# Patient Record
Sex: MALE | Race: BLACK OR AFRICAN AMERICAN | NOT HISPANIC OR LATINO | Employment: FULL TIME | URBAN - METROPOLITAN AREA
[De-identification: names, ages, dates, MRNs, and addresses within clinical notes are randomized per-mention and may not be internally consistent; named-entity substitution may affect disease eponyms.]

---

## 2023-02-12 PROBLEM — R20.0 NUMBNESS OF RIGHT HAND: Status: ACTIVE | Noted: 2023-02-12

## 2023-02-12 PROBLEM — E06.1 SUBACUTE THYROIDITIS: Status: ACTIVE | Noted: 2023-02-12

## 2023-02-12 PROBLEM — S83.289A ACUTE LATERAL MENISCAL TEAR: Status: ACTIVE | Noted: 2023-02-12

## 2023-02-12 PROBLEM — R20.9 SENSORY DISTURBANCE: Status: ACTIVE | Noted: 2023-02-12

## 2023-02-12 PROBLEM — K65.1 ABSCESS OF ABDOMINAL CAVITY (MULTI): Status: ACTIVE | Noted: 2023-02-12

## 2023-02-12 PROBLEM — R73.03 PREDIABETES: Status: ACTIVE | Noted: 2023-02-12

## 2023-02-12 PROBLEM — M54.50 LOW BACK PAIN: Status: ACTIVE | Noted: 2023-02-12

## 2023-02-12 PROBLEM — G56.00 CARPAL TUNNEL SYNDROME: Status: ACTIVE | Noted: 2023-02-12

## 2023-02-12 PROBLEM — K40.90 INGUINAL HERNIA: Status: ACTIVE | Noted: 2023-02-12

## 2023-02-12 PROBLEM — E03.9 HYPOTHYROIDISM: Status: ACTIVE | Noted: 2023-02-12

## 2023-02-12 PROBLEM — H00.019 STYE: Status: ACTIVE | Noted: 2023-02-12

## 2023-02-12 PROBLEM — G47.33 OSA (OBSTRUCTIVE SLEEP APNEA): Status: ACTIVE | Noted: 2023-02-12

## 2023-02-12 PROBLEM — E03.8 HYPOTHYROIDISM DUE TO HASHIMOTO'S THYROIDITIS: Status: ACTIVE | Noted: 2023-02-12

## 2023-02-12 PROBLEM — G56.02 LEFT CARPAL TUNNEL SYNDROME: Status: ACTIVE | Noted: 2023-02-12

## 2023-02-12 PROBLEM — M76.60 ACHILLES TENDINITIS: Status: ACTIVE | Noted: 2023-02-12

## 2023-02-12 PROBLEM — H10.32 ACUTE BACTERIAL CONJUNCTIVITIS OF LEFT EYE: Status: ACTIVE | Noted: 2023-02-12

## 2023-02-12 PROBLEM — M54.16 LUMBAR RADICULOPATHY: Status: ACTIVE | Noted: 2023-02-12

## 2023-02-12 PROBLEM — E78.5 HYPERLIPIDEMIA: Status: ACTIVE | Noted: 2023-02-12

## 2023-02-12 PROBLEM — G47.00 INSOMNIA: Status: ACTIVE | Noted: 2023-02-12

## 2023-02-12 PROBLEM — G56.01 RIGHT CARPAL TUNNEL SYNDROME: Status: ACTIVE | Noted: 2023-02-12

## 2023-02-12 PROBLEM — E55.9 VITAMIN D DEFICIENCY: Status: ACTIVE | Noted: 2023-02-12

## 2023-02-12 PROBLEM — R53.82 CHRONIC FATIGUE: Status: ACTIVE | Noted: 2023-02-12

## 2023-02-12 PROBLEM — R20.0 NUMBNESS OF RIGHT FOOT: Status: ACTIVE | Noted: 2023-02-12

## 2023-02-12 PROBLEM — J22 ACUTE RESPIRATORY INFECTION: Status: ACTIVE | Noted: 2023-02-12

## 2023-02-12 PROBLEM — G62.9 PERIPHERAL NEUROPATHY: Status: ACTIVE | Noted: 2023-02-12

## 2023-02-12 PROBLEM — B07.9 WARTS: Status: ACTIVE | Noted: 2023-02-12

## 2023-02-12 PROBLEM — M21.40 PES PLANUS: Status: ACTIVE | Noted: 2023-02-12

## 2023-02-12 PROBLEM — D64.9 LOW HEMOGLOBIN: Status: ACTIVE | Noted: 2023-02-12

## 2023-02-12 PROBLEM — E06.3 HYPOTHYROIDISM DUE TO HASHIMOTO'S THYROIDITIS: Status: ACTIVE | Noted: 2023-02-12

## 2023-02-12 RX ORDER — LEVOTHYROXINE SODIUM 200 UG/1
1 TABLET ORAL DAILY
COMMUNITY
Start: 2017-06-14 | End: 2023-12-27 | Stop reason: SDUPTHER

## 2023-02-12 RX ORDER — LEVOTHYROXINE SODIUM 50 UG/1
50 TABLET ORAL DAILY
COMMUNITY
End: 2023-12-27 | Stop reason: SDUPTHER

## 2023-02-12 RX ORDER — LORATADINE 10 MG/1
1 TABLET ORAL DAILY
COMMUNITY
Start: 2022-09-21 | End: 2023-03-22 | Stop reason: SDUPTHER

## 2023-02-12 RX ORDER — ROSUVASTATIN CALCIUM 40 MG/1
1 TABLET, COATED ORAL DAILY
COMMUNITY
Start: 2022-01-05 | End: 2023-03-22 | Stop reason: SDUPTHER

## 2023-03-22 ENCOUNTER — OFFICE VISIT (OUTPATIENT)
Dept: PRIMARY CARE | Facility: CLINIC | Age: 43
End: 2023-03-22
Payer: COMMERCIAL

## 2023-03-22 VITALS
HEIGHT: 71 IN | BODY MASS INDEX: 30.52 KG/M2 | SYSTOLIC BLOOD PRESSURE: 120 MMHG | HEART RATE: 76 BPM | WEIGHT: 218 LBS | DIASTOLIC BLOOD PRESSURE: 90 MMHG

## 2023-03-22 DIAGNOSIS — E78.2 MIXED HYPERLIPIDEMIA: ICD-10-CM

## 2023-03-22 DIAGNOSIS — H92.01 OTALGIA OF RIGHT EAR: Primary | ICD-10-CM

## 2023-03-22 DIAGNOSIS — J30.2 SEASONAL ALLERGIES: ICD-10-CM

## 2023-03-22 DIAGNOSIS — I10 BENIGN HYPERTENSION: ICD-10-CM

## 2023-03-22 PROCEDURE — 3080F DIAST BP >= 90 MM HG: CPT | Performed by: NURSE PRACTITIONER

## 2023-03-22 PROCEDURE — 99214 OFFICE O/P EST MOD 30 MIN: CPT | Performed by: NURSE PRACTITIONER

## 2023-03-22 PROCEDURE — 4004F PT TOBACCO SCREEN RCVD TLK: CPT | Performed by: NURSE PRACTITIONER

## 2023-03-22 PROCEDURE — 3074F SYST BP LT 130 MM HG: CPT | Performed by: NURSE PRACTITIONER

## 2023-03-22 RX ORDER — CHLORTHALIDONE 25 MG/1
25 TABLET ORAL DAILY
Qty: 90 TABLET | Refills: 1 | Status: SHIPPED | OUTPATIENT
Start: 2023-03-22 | End: 2023-08-09

## 2023-03-22 RX ORDER — ROSUVASTATIN CALCIUM 40 MG/1
40 TABLET, COATED ORAL DAILY
Qty: 90 TABLET | Refills: 1 | Status: SHIPPED | OUTPATIENT
Start: 2023-03-22 | End: 2023-08-11 | Stop reason: SDUPTHER

## 2023-03-22 RX ORDER — LORATADINE 10 MG/1
10 TABLET ORAL DAILY
Qty: 30 TABLET | Refills: 1 | Status: SHIPPED | OUTPATIENT
Start: 2023-03-22 | End: 2023-08-09

## 2023-03-22 RX ORDER — CIPROFLOXACIN AND DEXAMETHASONE 3; 1 MG/ML; MG/ML
4 SUSPENSION/ DROPS AURICULAR (OTIC) 2 TIMES DAILY
Qty: 7.5 ML | Refills: 0 | Status: SHIPPED | OUTPATIENT
Start: 2023-03-22 | End: 2023-03-29

## 2023-03-22 NOTE — ASSESSMENT & PLAN NOTE
Start statin. Watch foods high in cholesterol (fried foods, fast food, processed meats, desserts such as cookies, cake, ice cream, pastries and other sweets). Some foods that are high in cholesterol are healthy additions to your diet (eggs, cheese, shellfish, pasture raised steak, organ meats such as heart, kidney and liver, sardines and full-fat yogurt.

## 2023-03-22 NOTE — PROGRESS NOTES
"Subjective   Patient ID: Mac Bello is a 42 y.o. male who presents for Follow-up (Pt states he is here for a routine follow up visit ), Hyperlipidemia, and Earache (Right ear pain ).      Hyperlipidemia  This is a chronic problem. The problem is uncontrolled. Recent lipid tests were reviewed and are high. Current antihyperlipidemic treatment includes statins. Risk factors for coronary artery disease include hypertension and dyslipidemia.   Earache   There is pain in the right ear. This is a new problem. The current episode started in the past 7 days. The problem has been waxing and waning. There has been no fever. Pertinent negatives include no ear discharge. He has tried nothing for the symptoms.        Review of Systems   HENT:  Positive for ear pain. Negative for ear discharge.    All other systems reviewed and are negative.      Objective   BP (!) 143/93   Pulse 76   Ht 1.803 m (5' 11\")   Wt 98.9 kg (218 lb)   BMI 30.40 kg/m²     Physical Exam  Constitutional:       Appearance: Normal appearance.   HENT:      Head: Normocephalic and atraumatic.      Right Ear: Tympanic membrane and external ear normal. There is no impacted cerumen.      Left Ear: Tympanic membrane, ear canal and external ear normal.      Nose: Nose normal.      Mouth/Throat:      Mouth: Mucous membranes are dry.      Pharynx: Oropharynx is clear.   Eyes:      Extraocular Movements: Extraocular movements intact.      Conjunctiva/sclera: Conjunctivae normal.      Pupils: Pupils are equal, round, and reactive to light.   Cardiovascular:      Rate and Rhythm: Normal rate and regular rhythm.   Pulmonary:      Effort: Pulmonary effort is normal.      Breath sounds: Normal breath sounds.   Abdominal:      General: Abdomen is flat. Bowel sounds are normal.      Palpations: Abdomen is soft.   Musculoskeletal:         General: Normal range of motion.      Cervical back: Normal range of motion.   Skin:     General: Skin is warm and dry. "   Neurological:      General: No focal deficit present.      Mental Status: He is alert and oriented to person, place, and time. Mental status is at baseline.   Psychiatric:         Mood and Affect: Mood normal.         Behavior: Behavior normal.         Thought Content: Thought content normal.         Judgment: Judgment normal.         Assessment/Plan   Problem List Items Addressed This Visit          Other    Hyperlipidemia     Start statin. Watch foods high in cholesterol (fried foods, fast food, processed meats, desserts such as cookies, cake, ice cream, pastries and other sweets). Some foods that are high in cholesterol are healthy additions to your diet (eggs, cheese, shellfish, pasture raised steak, organ meats such as heart, kidney and liver, sardines and full-fat yogurt.          Relevant Medications    rosuvastatin (Crestor) 40 mg tablet     Other Visit Diagnoses       Otalgia of right ear    -  Primary    Relevant Medications    ciprofloxacin-dexamethasone (CiproDEX) otic suspension    Seasonal allergies        Relevant Medications    loratadine (Claritin) 10 mg tablet    Benign hypertension        Relevant Medications    chlorthalidone (Hygroton) 25 mg tablet          Has upcoming blood work with endocrinology.

## 2023-04-19 LAB
ALANINE AMINOTRANSFERASE (SGPT) (U/L) IN SER/PLAS: 29 U/L (ref 10–52)
ALBUMIN (G/DL) IN SER/PLAS: 4.8 G/DL (ref 3.4–5)
ALKALINE PHOSPHATASE (U/L) IN SER/PLAS: 54 U/L (ref 33–120)
ANION GAP IN SER/PLAS: 15 MMOL/L (ref 10–20)
ASPARTATE AMINOTRANSFERASE (SGOT) (U/L) IN SER/PLAS: 29 U/L (ref 9–39)
BILIRUBIN TOTAL (MG/DL) IN SER/PLAS: 0.4 MG/DL (ref 0–1.2)
CALCIDIOL (25 OH VITAMIN D3) (NG/ML) IN SER/PLAS: 22 NG/ML
CALCIUM (MG/DL) IN SER/PLAS: 10 MG/DL (ref 8.6–10.6)
CARBON DIOXIDE, TOTAL (MMOL/L) IN SER/PLAS: 29 MMOL/L (ref 21–32)
CHLORIDE (MMOL/L) IN SER/PLAS: 105 MMOL/L (ref 98–107)
CHOLESTEROL (MG/DL) IN SER/PLAS: 210 MG/DL (ref 0–199)
CHOLESTEROL IN HDL (MG/DL) IN SER/PLAS: 53.6 MG/DL
CHOLESTEROL/HDL RATIO: 3.9
CORTISOL (UG/DL) IN SERUM - AM: 15.1 UG/DL (ref 5–20)
CREATININE (MG/DL) IN SER/PLAS: 0.97 MG/DL (ref 0.5–1.3)
DEHYDROEPIANDROSTERONE SULFATE (DHEA-S) (UG/DL) IN SER/: 379 UG/DL (ref 95–530)
ESTIMATED AVERAGE GLUCOSE FOR HBA1C: 126 MG/DL
FOLLITROPIN (IU/L) IN SER/PLAS: 7.9 IU/L
GFR MALE: >90 ML/MIN/1.73M2
GLUCOSE (MG/DL) IN SER/PLAS: 104 MG/DL (ref 74–99)
HEMOGLOBIN A1C/HEMOGLOBIN TOTAL IN BLOOD: 6 %
INSULIN: 13 UIU/ML (ref 3–25)
LDL: 140 MG/DL (ref 0–99)
LUTEINIZING HORMONE (IU/ML) IN SER/PLAS: 6.4 IU/L
PARATHYRIN INTACT (PG/ML) IN SER/PLAS: 41.5 PG/ML (ref 18.5–88)
POTASSIUM (MMOL/L) IN SER/PLAS: 4.3 MMOL/L (ref 3.5–5.3)
PROLACTIN (UG/L) IN SER/PLAS: 4.5 UG/L (ref 2–18)
PROTEIN TOTAL: 7.2 G/DL (ref 6.4–8.2)
SODIUM (MMOL/L) IN SER/PLAS: 145 MMOL/L (ref 136–145)
THYROPEROXIDASE AB (IU/ML) IN SER/PLAS: >1000 IU/ML
THYROTROPIN (MIU/L) IN SER/PLAS BY DETECTION LIMIT <= 0.05 MIU/L: 0.26 MIU/L (ref 0.44–3.98)
THYROXINE (T4) FREE (NG/DL) IN SER/PLAS: 1.99 NG/DL (ref 0.78–1.48)
TRIGLYCERIDE (MG/DL) IN SER/PLAS: 83 MG/DL (ref 0–149)
TRIIODOTHYRONINE (T3) (NG/DL) IN SER/PLAS: 151 NG/DL (ref 60–200)
UREA NITROGEN (MG/DL) IN SER/PLAS: 28 MG/DL (ref 6–23)
VLDL: 17 MG/DL (ref 0–40)

## 2023-04-22 LAB — ADRENOCORTICOTROPIC HORMONE: 37.9 PG/ML (ref 7.2–63.3)

## 2023-04-24 LAB — ALPHA SUBUNIT: 0.2 NG/ML

## 2023-04-26 ENCOUNTER — OFFICE VISIT (OUTPATIENT)
Dept: PRIMARY CARE | Facility: CLINIC | Age: 43
End: 2023-04-26
Payer: COMMERCIAL

## 2023-04-26 VITALS
BODY MASS INDEX: 28.7 KG/M2 | SYSTOLIC BLOOD PRESSURE: 132 MMHG | HEART RATE: 81 BPM | HEIGHT: 71 IN | WEIGHT: 205 LBS | DIASTOLIC BLOOD PRESSURE: 84 MMHG

## 2023-04-26 DIAGNOSIS — R73.03 PREDIABETES: ICD-10-CM

## 2023-04-26 DIAGNOSIS — E06.3 HYPOTHYROIDISM DUE TO HASHIMOTO'S THYROIDITIS: Primary | ICD-10-CM

## 2023-04-26 DIAGNOSIS — E55.9 VITAMIN D DEFICIENCY: ICD-10-CM

## 2023-04-26 DIAGNOSIS — E78.2 MIXED HYPERLIPIDEMIA: ICD-10-CM

## 2023-04-26 DIAGNOSIS — E03.8 HYPOTHYROIDISM DUE TO HASHIMOTO'S THYROIDITIS: Primary | ICD-10-CM

## 2023-04-26 LAB
TESTOSTERONE FREE (CHAN): 71.9 PG/ML (ref 35–155)
TESTOSTERONE,TOTAL,LC-MS/MS: 411 NG/DL (ref 250–1100)
THYROID STIMULATING IMMUNOGLOBULIN: <1 TSI INDEX

## 2023-04-26 PROCEDURE — 4004F PT TOBACCO SCREEN RCVD TLK: CPT | Performed by: NURSE PRACTITIONER

## 2023-04-26 PROCEDURE — 99213 OFFICE O/P EST LOW 20 MIN: CPT | Performed by: NURSE PRACTITIONER

## 2023-04-26 RX ORDER — ACETAMINOPHEN 500 MG
50 TABLET ORAL DAILY
Qty: 90 CAPSULE | Refills: 1 | Status: SHIPPED | OUTPATIENT
Start: 2023-04-26 | End: 2023-08-09

## 2023-04-26 NOTE — PROGRESS NOTES
"Subjective   Patient ID: Mac Bello is a 42 y.o. male who presents for Follow-up (Follow up visit on lab work ).    Mca presents to the office today for follow-up on medications. He has been feeling well. Recently had blood work done that was ordered by Dr. Mckeon (endocrinology). Stated he hasn't felt this well for the past 3 years.          Review of Systems   All other systems reviewed and are negative.      Objective   /84   Pulse 81   Ht 1.803 m (5' 11\")   Wt 93 kg (205 lb)   BMI 28.59 kg/m²     Physical Exam  Constitutional:       Appearance: Normal appearance.   HENT:      Head: Normocephalic and atraumatic.      Right Ear: Tympanic membrane, ear canal and external ear normal.      Left Ear: Tympanic membrane, ear canal and external ear normal.      Nose: Nose normal.      Mouth/Throat:      Mouth: Mucous membranes are moist.      Pharynx: Oropharynx is clear.   Eyes:      Extraocular Movements: Extraocular movements intact.      Conjunctiva/sclera: Conjunctivae normal.      Pupils: Pupils are equal, round, and reactive to light.   Cardiovascular:      Rate and Rhythm: Normal rate and regular rhythm.   Pulmonary:      Effort: Pulmonary effort is normal.      Breath sounds: Normal breath sounds.   Abdominal:      General: Abdomen is flat. Bowel sounds are normal.      Palpations: Abdomen is soft.   Musculoskeletal:         General: Normal range of motion.      Cervical back: Normal range of motion.   Skin:     General: Skin is warm and dry.   Neurological:      General: No focal deficit present.      Mental Status: He is alert and oriented to person, place, and time. Mental status is at baseline.   Psychiatric:         Mood and Affect: Mood normal.         Behavior: Behavior normal.         Thought Content: Thought content normal.         Judgment: Judgment normal.         Assessment/Plan   Problem List Items Addressed This Visit          Endocrine/Metabolic    Hypothyroidism - Primary    " Prediabetes       Other    Hyperlipidemia     1) Hypothyroidism: TSH low at 0.26. Per Dr. Mckeon, change Levothyroxine to 250 mcg weekdays and 200 mcg on weekends. Recheck TSH in 8 weeks.    2) Prediabetes: A1c 6.0. Counseled on watching daily carbohydrates (portion control) such as breads, pasta, rice, starchy vegetables and sweets.     3) Hyperlipidemia: cholesterol levels improving. Watch foods high in cholesterol (fried foods, fast food, processed meats, desserts such as cookies, cake, ice cream, pastries and other sweets). Some foods that are high in cholesterol are healthy additions to your diet (eggs, cheese, shellfish, pasture raised steak, organ meats such as heart, kidney and liver, sardines and full-fat yogurt.     4) Vitamin D deficiency: vitamin D level low. Counseled on foods high in vitamin D (tuna, salmon, milk, orange juice, soy milk, cereals, beef liver, cheese, egg yolks and mushrooms). Start vitamin D 2000 units daily.

## 2023-08-09 ENCOUNTER — OFFICE VISIT (OUTPATIENT)
Dept: PRIMARY CARE | Facility: CLINIC | Age: 43
End: 2023-08-09
Payer: COMMERCIAL

## 2023-08-09 VITALS
SYSTOLIC BLOOD PRESSURE: 119 MMHG | HEART RATE: 55 BPM | BODY MASS INDEX: 27.44 KG/M2 | DIASTOLIC BLOOD PRESSURE: 77 MMHG | HEIGHT: 71 IN | WEIGHT: 196 LBS

## 2023-08-09 DIAGNOSIS — E03.9 HYPOTHYROIDISM, UNSPECIFIED TYPE: Primary | ICD-10-CM

## 2023-08-09 DIAGNOSIS — M25.512 ACUTE PAIN OF LEFT SHOULDER: ICD-10-CM

## 2023-08-09 DIAGNOSIS — E78.2 MIXED HYPERLIPIDEMIA: ICD-10-CM

## 2023-08-09 LAB
ALANINE AMINOTRANSFERASE (SGPT) (U/L) IN SER/PLAS: 18 U/L (ref 10–52)
ALBUMIN (G/DL) IN SER/PLAS: 4.5 G/DL (ref 3.4–5)
ALKALINE PHOSPHATASE (U/L) IN SER/PLAS: 61 U/L (ref 33–120)
ANION GAP IN SER/PLAS: 16 MMOL/L (ref 10–20)
ASPARTATE AMINOTRANSFERASE (SGOT) (U/L) IN SER/PLAS: 18 U/L (ref 9–39)
BILIRUBIN TOTAL (MG/DL) IN SER/PLAS: 0.6 MG/DL (ref 0–1.2)
CALCIUM (MG/DL) IN SER/PLAS: 9.6 MG/DL (ref 8.6–10.6)
CARBON DIOXIDE, TOTAL (MMOL/L) IN SER/PLAS: 27 MMOL/L (ref 21–32)
CHLORIDE (MMOL/L) IN SER/PLAS: 105 MMOL/L (ref 98–107)
CHOLESTEROL (MG/DL) IN SER/PLAS: 242 MG/DL (ref 0–199)
CHOLESTEROL IN HDL (MG/DL) IN SER/PLAS: 73 MG/DL
CHOLESTEROL/HDL RATIO: 3.3
CREATININE (MG/DL) IN SER/PLAS: 0.97 MG/DL (ref 0.5–1.3)
GFR MALE: >90 ML/MIN/1.73M2
GLUCOSE (MG/DL) IN SER/PLAS: 107 MG/DL (ref 74–99)
LDL: 148 MG/DL (ref 0–99)
POTASSIUM (MMOL/L) IN SER/PLAS: 3.8 MMOL/L (ref 3.5–5.3)
PROTEIN TOTAL: 6.9 G/DL (ref 6.4–8.2)
SODIUM (MMOL/L) IN SER/PLAS: 144 MMOL/L (ref 136–145)
THYROTROPIN (MIU/L) IN SER/PLAS BY DETECTION LIMIT <= 0.05 MIU/L: 1.51 MIU/L (ref 0.44–3.98)
TRIGLYCERIDE (MG/DL) IN SER/PLAS: 104 MG/DL (ref 0–149)
UREA NITROGEN (MG/DL) IN SER/PLAS: 21 MG/DL (ref 6–23)
VLDL: 21 MG/DL (ref 0–40)

## 2023-08-09 PROCEDURE — 80061 LIPID PANEL: CPT

## 2023-08-09 PROCEDURE — 4004F PT TOBACCO SCREEN RCVD TLK: CPT | Performed by: NURSE PRACTITIONER

## 2023-08-09 PROCEDURE — 84443 ASSAY THYROID STIM HORMONE: CPT

## 2023-08-09 PROCEDURE — 80053 COMPREHEN METABOLIC PANEL: CPT

## 2023-08-09 PROCEDURE — 99214 OFFICE O/P EST MOD 30 MIN: CPT | Performed by: NURSE PRACTITIONER

## 2023-08-09 RX ORDER — PREDNISONE 10 MG/1
TABLET ORAL
Qty: 20 TABLET | Refills: 0 | Status: SHIPPED | OUTPATIENT
Start: 2023-08-09 | End: 2023-12-27 | Stop reason: ALTCHOICE

## 2023-08-09 ASSESSMENT — ENCOUNTER SYMPTOMS
LOSS OF SENSATION IN FEET: 0
DEPRESSION: 0
OCCASIONAL FEELINGS OF UNSTEADINESS: 0

## 2023-08-09 ASSESSMENT — PATIENT HEALTH QUESTIONNAIRE - PHQ9
1. LITTLE INTEREST OR PLEASURE IN DOING THINGS: NOT AT ALL
2. FEELING DOWN, DEPRESSED OR HOPELESS: NOT AT ALL
SUM OF ALL RESPONSES TO PHQ9 QUESTIONS 1 AND 2: 0

## 2023-08-09 NOTE — PROGRESS NOTES
"Subjective   Patient ID: Mac Bello is a 42 y.o. male who presents for Follow-up (3 month routine follow up visit for thyroid ).    Mac is a 42-year-old male who presents to the office today for follow-up on hypothyroidism.  He is pleased with his weight; stating he has not been in the 190s for a very long time.    He reported that he has been having left shoulder pain.  He denied any recent injury, trauma or heavy lifting.  Stated his shoulder has been hurting for the past 2 weeks.  He does have decreased range of motion in his left shoulder with abduction. Denied exercising and lifting weights. Has not been taking any OTC pain medications on a routine basis.     Admitted he stopped taking cholesterol medication since his cholesterol \"had improved\".         Review of Systems   All other systems reviewed and are negative.      Objective   /77   Pulse 55   Ht 1.803 m (5' 11\")   Wt 88.9 kg (196 lb)   BMI 27.34 kg/m²     Physical Exam  Constitutional:       Appearance: Normal appearance.   HENT:      Head: Normocephalic and atraumatic.      Right Ear: Tympanic membrane, ear canal and external ear normal.      Left Ear: Tympanic membrane, ear canal and external ear normal.      Nose: Nose normal.      Mouth/Throat:      Mouth: Mucous membranes are moist.      Pharynx: Oropharynx is clear.   Eyes:      Extraocular Movements: Extraocular movements intact.      Conjunctiva/sclera: Conjunctivae normal.      Pupils: Pupils are equal, round, and reactive to light.   Cardiovascular:      Rate and Rhythm: Normal rate and regular rhythm.   Pulmonary:      Effort: Pulmonary effort is normal.      Breath sounds: Normal breath sounds.   Abdominal:      General: Abdomen is flat. Bowel sounds are normal.      Palpations: Abdomen is soft.   Musculoskeletal:      Cervical back: Normal range of motion.      Comments: Decreased ROM abduction in left shoulder. No point tenderness. Strength 5/5.   Skin:     General: Skin is " warm and dry.   Neurological:      General: No focal deficit present.      Mental Status: He is alert and oriented to person, place, and time. Mental status is at baseline.   Psychiatric:         Mood and Affect: Mood normal.         Behavior: Behavior normal.         Thought Content: Thought content normal.         Judgment: Judgment normal.         Assessment/Plan   Problem List Items Addressed This Visit       Hyperlipidemia    Relevant Orders    Comprehensive Metabolic Panel    Lipid Panel    Hypothyroidism - Primary    Relevant Orders    Thyroid Stimulating Hormone    T4, free    T3, free     Other Visit Diagnoses       Acute pain of left shoulder        Relevant Medications    predniSONE (Deltasone) 10 mg tablet    Other Relevant Orders    XR shoulder left 2+ views          1) Hypothyroidism: thyroid blood work ordered. Continue Levothyroxine.     2) Left shoulder pain: x-ray ordered of left shoulder. You were prescribed tapering dose of Prednisone. Recommend heat/ice alternation to left shoulder along with shoulder stretches/exercises.    3) Hyperlipidemia: CMP and lipids ordered. Watch foods high in cholesterol (fried foods, fast food, processed meats, desserts such as cookies, cake, ice cream, pastries and other sweets). Some foods that are high in cholesterol are healthy additions to your diet (eggs, cheese, shellfish, pasture raised steak, organ meats such as heart, kidney and liver, sardines and full-fat yogurt.

## 2023-08-10 ENCOUNTER — TELEPHONE (OUTPATIENT)
Dept: PRIMARY CARE | Facility: CLINIC | Age: 43
End: 2023-08-10
Payer: COMMERCIAL

## 2023-08-10 DIAGNOSIS — E78.2 MIXED HYPERLIPIDEMIA: ICD-10-CM

## 2023-08-11 RX ORDER — ROSUVASTATIN CALCIUM 20 MG/1
20 TABLET, COATED ORAL DAILY
Qty: 90 TABLET | Refills: 1 | Status: SHIPPED | OUTPATIENT
Start: 2023-08-11 | End: 2023-12-27 | Stop reason: SDUPTHER

## 2023-08-14 ENCOUNTER — TELEPHONE (OUTPATIENT)
Dept: PRIMARY CARE | Facility: CLINIC | Age: 43
End: 2023-08-14
Payer: COMMERCIAL

## 2023-08-16 ENCOUNTER — TELEPHONE (OUTPATIENT)
Dept: PRIMARY CARE | Facility: CLINIC | Age: 43
End: 2023-08-16
Payer: COMMERCIAL

## 2023-08-21 ENCOUNTER — TELEPHONE (OUTPATIENT)
Dept: PRIMARY CARE | Facility: CLINIC | Age: 43
End: 2023-08-21
Payer: COMMERCIAL

## 2023-12-27 ENCOUNTER — OFFICE VISIT (OUTPATIENT)
Dept: PRIMARY CARE | Facility: CLINIC | Age: 43
End: 2023-12-27
Payer: COMMERCIAL

## 2023-12-27 VITALS
HEIGHT: 71 IN | DIASTOLIC BLOOD PRESSURE: 81 MMHG | HEART RATE: 62 BPM | SYSTOLIC BLOOD PRESSURE: 137 MMHG | BODY MASS INDEX: 28.14 KG/M2 | WEIGHT: 201 LBS

## 2023-12-27 DIAGNOSIS — E03.9 HYPOTHYROIDISM, UNSPECIFIED TYPE: ICD-10-CM

## 2023-12-27 DIAGNOSIS — Z00.00 ANNUAL PHYSICAL EXAM: Primary | ICD-10-CM

## 2023-12-27 DIAGNOSIS — E78.2 MIXED HYPERLIPIDEMIA: ICD-10-CM

## 2023-12-27 DIAGNOSIS — M79.672 PAIN OF LEFT HEEL: ICD-10-CM

## 2023-12-27 LAB
T4 FREE SERPL-MCNC: <0.2 NG/DL (ref 0.78–1.48)
TSH SERPL-ACNC: >120 MIU/L (ref 0.44–3.98)

## 2023-12-27 PROCEDURE — 99213 OFFICE O/P EST LOW 20 MIN: CPT | Performed by: NURSE PRACTITIONER

## 2023-12-27 PROCEDURE — 84443 ASSAY THYROID STIM HORMONE: CPT

## 2023-12-27 PROCEDURE — 4004F PT TOBACCO SCREEN RCVD TLK: CPT | Performed by: NURSE PRACTITIONER

## 2023-12-27 PROCEDURE — 99396 PREV VISIT EST AGE 40-64: CPT | Performed by: NURSE PRACTITIONER

## 2023-12-27 PROCEDURE — 84439 ASSAY OF FREE THYROXINE: CPT

## 2023-12-27 PROCEDURE — 36415 COLL VENOUS BLD VENIPUNCTURE: CPT

## 2023-12-27 RX ORDER — LEVOTHYROXINE SODIUM 50 UG/1
50 TABLET ORAL
Qty: 90 TABLET | Refills: 1 | Status: SHIPPED | OUTPATIENT
Start: 2023-12-27 | End: 2024-04-04

## 2023-12-27 RX ORDER — ROSUVASTATIN CALCIUM 20 MG/1
20 TABLET, COATED ORAL DAILY
Qty: 90 TABLET | Refills: 1 | Status: SHIPPED | OUTPATIENT
Start: 2023-12-27 | End: 2024-04-04

## 2023-12-27 RX ORDER — LEVOTHYROXINE SODIUM 200 UG/1
200 TABLET ORAL
Qty: 90 TABLET | Refills: 1 | Status: SHIPPED | OUTPATIENT
Start: 2023-12-27 | End: 2024-04-04

## 2023-12-27 ASSESSMENT — PATIENT HEALTH QUESTIONNAIRE - PHQ9
SUM OF ALL RESPONSES TO PHQ9 QUESTIONS 1 AND 2: 0
1. LITTLE INTEREST OR PLEASURE IN DOING THINGS: NOT AT ALL
2. FEELING DOWN, DEPRESSED OR HOPELESS: NOT AT ALL

## 2023-12-27 ASSESSMENT — ENCOUNTER SYMPTOMS
LOSS OF SENSATION IN FEET: 0
OCCASIONAL FEELINGS OF UNSTEADINESS: 0
DEPRESSION: 0

## 2023-12-27 NOTE — PROGRESS NOTES
Reason for Visit: Annual Physical Exam    HPI: Mac is a 43 year old male who presents to the office today for an annual physical exam. Stated he has been out of his thyroid medication for the past 3 weeks.     Heel of left foot hurting. Going to [place] in Feb. Would like to have foot evaluated prior to trip to [place] as he will be doing significant walking while there.    Continues to have left shoulder pain. Did not get shoulder x-ray.       Active Problem List  Patient Active Problem List   Diagnosis    Abscess of abdominal cavity (CMS/[facility])    Achilles tendinitis    Acute bacterial conjunctivitis of left eye    Acute lateral meniscal tear    Acute respiratory infection    Right carpal tunnel syndrome    Chronic fatigue    Hyperlipidemia    Hypothyroidism    Hypothyroidism due to Hashimoto's thyroiditis    Inguinal hernia    Insomnia    Low back pain    Low hemoglobin    Lumbar radiculopathy    Numbness of right foot    Numbness of right hand    MURIEL (obstructive sleep apnea)    Peripheral neuropathy    Pes planus    Prediabetes    Sensory disturbance    Stye    Subacute thyroiditis    Vitamin D deficiency    Warts    Left carpal tunnel syndrome       Comprehensive Medical/Surgical/Social/Family History  Past Medical History:   Diagnosis Date    Flat foot (pes planus) (acquired), unspecified foot 03/10/2015    Pes planus    Hyperlipidemia, unspecified 09/21/2022    Hyperlipidemia    Hypothyroidism, unspecified 12/30/2022    Hypothyroidism    Low back pain, unspecified 03/09/2022    Low back pain     Past Surgical History:   Procedure Laterality Date    OTHER SURGICAL HISTORY  11/29/2016    Laparoscopy Repair Of Initial Inguinal Hernia    OTHER SURGICAL HISTORY  03/25/2020    Surgery     Social History     Social History Narrative    Not on file         Allergies and Medications  Patient has no known allergies.  Current Outpatient Medications on File Prior to Visit   Medication Sig Dispense Refill     "[DISCONTINUED] levothyroxine (Synthroid, Levoxyl) 200 mcg tablet Take 1 tablet (200 mcg) by mouth once daily.      [DISCONTINUED] levothyroxine (Synthroid, Levoxyl) 50 mcg tablet Take 1 tablet (50 mcg) by mouth once daily.      [DISCONTINUED] rosuvastatin (Crestor) 20 mg tablet Take 1 tablet (20 mg) by mouth once daily. 90 tablet 1    [DISCONTINUED] predniSONE (Deltasone) 10 mg tablet 4 x 2 days, 3 x 2 days, 2 x 2 days, 1 x 2 days. (Patient not taking: Reported on 12/27/2023) 20 tablet 0     No current facility-administered medications on file prior to visit.         ROS otherwise negative aside from what was mentioned above in HPI.    Vitals  /81   Pulse 62   Ht 1.803 m (5' 11\")   Wt 91.2 kg (201 lb)   BMI 28.03 kg/m²   Body mass index is 28.03 kg/m².  Physical Exam  Gen: Alert, NAD  HEENT:  PERRLA, EOMI, conjunctiva and sclera normal in appearance. External auditory canals/TMs normal; Oral cavity and posterior pharynx without lesions/exudate  Neck:  Supple with FROM; No masses/nodes palpable; Thyroid nontender and without nodules; No NEREYDA  Respiratory:  Lungs CTAB  Cardiovascular:  Heart RRR. No M/R/G. Peripheral pulses equal bilaterally  Abdomen:  Soft, nontender, BS present throughout; No R/G/R; No HSM or masses palpated  Extremities:  FROM all extremities; Muscle strength grossly normal with good tone  Neuro:  CN II-XII intact; Reflexes 2+/2+; Gross motor and sensory intact  Skin:  No suspicious lesions present  Breast: No masses, skin lesions or nipple discharges, no axillary lymphadenopathy    Assessment and Plan:  Problem List Items Addressed This Visit       Hyperlipidemia    Relevant Medications    rosuvastatin (Crestor) 20 mg tablet    Hypothyroidism    Relevant Medications    levothyroxine (Synthroid, Levoxyl) 200 mcg tablet    levothyroxine (Synthroid, Levoxyl) 50 mcg tablet    Other Relevant Orders    Thyroid Stimulating Hormone (Completed)    T3, free    Thyroxine, Free (Completed)     Other " Visit Diagnoses       Annual physical exam    -  Primary    Pain of left heel        Relevant Orders    XR foot left 3+ views    Referral to Podiatry        1) Hypothyroidism: thyroid blood work ordered.    2) Left heel pain: x-ray ordered of foot; referral placed for podiatry.     3) Hyperlipidemia: Watch foods high in cholesterol (fried foods, fast food, processed meats, desserts such as cookies, cake, ice cream, pastries and other sweets). Some foods that are high in cholesterol are healthy additions to your diet (eggs, cheese, shellfish, pasture raised steak, organ meats such as heart, kidney and liver, sardines and full-fat yogurt.  Rosuvastatin refilled.

## 2023-12-29 ENCOUNTER — TELEPHONE (OUTPATIENT)
Dept: PRIMARY CARE | Facility: CLINIC | Age: 43
End: 2023-12-29
Payer: COMMERCIAL

## 2023-12-29 DIAGNOSIS — E03.9 HYPOTHYROIDISM, UNSPECIFIED TYPE: ICD-10-CM

## 2023-12-29 NOTE — TELEPHONE ENCOUNTER
----- Message from CISCO Bettencourt sent at 12/29/2023  4:36 PM EST -----  TSH is really elevated. Restart Levothyroxine 200 mcg daily (don't restart the 50 mcg; only 200 mcg). Recheck TSH in 8 weeks. He HAS to keep up on this med or he will send himself into a thyroid crisis.

## 2024-01-03 ENCOUNTER — HOSPITAL ENCOUNTER (OUTPATIENT)
Dept: RADIOLOGY | Facility: HOSPITAL | Age: 44
Discharge: HOME | End: 2024-01-03
Payer: COMMERCIAL

## 2024-01-03 DIAGNOSIS — M25.512 ACUTE PAIN OF LEFT SHOULDER: ICD-10-CM

## 2024-01-03 DIAGNOSIS — M79.672 PAIN OF LEFT HEEL: ICD-10-CM

## 2024-01-03 PROCEDURE — 73630 X-RAY EXAM OF FOOT: CPT | Mod: LEFT SIDE | Performed by: RADIOLOGY

## 2024-01-03 PROCEDURE — 73030 X-RAY EXAM OF SHOULDER: CPT | Mod: LEFT SIDE | Performed by: RADIOLOGY

## 2024-01-03 PROCEDURE — 73030 X-RAY EXAM OF SHOULDER: CPT | Mod: LT

## 2024-01-03 PROCEDURE — 73630 X-RAY EXAM OF FOOT: CPT | Mod: LT

## 2024-01-07 ENCOUNTER — TELEPHONE (OUTPATIENT)
Dept: PRIMARY CARE | Facility: CLINIC | Age: 44
End: 2024-01-07
Payer: COMMERCIAL

## 2024-01-07 DIAGNOSIS — M25.512 ACUTE PAIN OF LEFT SHOULDER: ICD-10-CM

## 2024-01-07 DIAGNOSIS — M19.019 ARTHRITIS OF SHOULDER: ICD-10-CM

## 2024-01-08 NOTE — TELEPHONE ENCOUNTER
----- Message from CISCO Bettencourt sent at 1/6/2024  9:59 PM EST -----  Left shoulder xray shows mild osteoarthritis. Recommend PT.

## 2024-03-06 ENCOUNTER — EVALUATION (OUTPATIENT)
Dept: PHYSICAL THERAPY | Facility: CLINIC | Age: 44
End: 2024-03-06
Payer: COMMERCIAL

## 2024-03-06 DIAGNOSIS — M25.512 ACUTE PAIN OF LEFT SHOULDER: Primary | ICD-10-CM

## 2024-03-06 DIAGNOSIS — M67.912 ROTATOR CUFF DYSFUNCTION, LEFT: ICD-10-CM

## 2024-03-06 PROCEDURE — 97140 MANUAL THERAPY 1/> REGIONS: CPT | Mod: GP | Performed by: PHYSICAL THERAPIST

## 2024-03-06 PROCEDURE — 97110 THERAPEUTIC EXERCISES: CPT | Mod: GP | Performed by: PHYSICAL THERAPIST

## 2024-03-06 PROCEDURE — 97161 PT EVAL LOW COMPLEX 20 MIN: CPT | Mod: GP | Performed by: PHYSICAL THERAPIST

## 2024-03-06 ASSESSMENT — ENCOUNTER SYMPTOMS
LOSS OF SENSATION IN FEET: 0
OCCASIONAL FEELINGS OF UNSTEADINESS: 0
DEPRESSION: 0

## 2024-03-06 NOTE — PROGRESS NOTES
"Physical Therapy    Physical Therapy Evaluation and Treatment      Patient Name: Mac Bello  MRN: [id number]  Today's Date: 3/6/2024  Time Calculation  Start Time: 1440  Stop Time: 1545  Time Calculation (min): 65 min  PT Evaluation Time Entry  PT Evaluation (Low) Time Entry: 35   PT Therapeutic Procedures Time Entry  Manual Therapy Time Entry: 15  Therapeutic Exercise Time Entry: 15         Assessment:  Pt is 43 y.o. right handed male with insidious 7 month history of left shoulder pain, primarily posterior and worse with activity.  He will benefit from skilled PT to reduce pain, improve posture, increase pain free AROM, restore normal left shoulder function, and improve his quality of life    Plan:  OP PT Plan  Treatment/Interventions: Cryotherapy, Dry needling, Education/ Instruction, Manual therapy, Taping techniques, Therapeutic activities, Therapeutic exercises  PT Plan: Skilled PT  PT Frequency: 1 time per week  Duration: 10 visits  Onset Date: 08/07/23  Rehab Potential: Good  Plan of Care Agreement: Patient    Current Problem:   1. Acute pain of left shoulder  Referral to Physical Therapy    Follow Up In Physical Therapy      2. Rotator cuff dysfunction, left  Follow Up In Physical Therapy          Subjective    General:  Pt presents to PT with chief c/o left shoulder pain of insidious onset 7-8 months ago. No specific injury or inciting factor. He thinks it may have been caused by carrying drywall. PCP provided a steroid taper after the pain began that did not help. The pain has gradually worsened since onset. Its hard to do ADLs - \"its unbearable\".  He is right handed.   Xray shows mild left shoulder OA.     Precautions:  Precautions  STEADI Fall Risk Score (The score of 4 or more indicates an increased risk of falling): 0  Precautions Comment: none    Pain:  Left shoulder pain is 6/10 currently, increases to 10/10 at worst. Worse with use. Even hurts at rest.   Pain disturbs his sleep. Lying on the " shoulder hurts.   No treatment to date except steroid taper.     Home Living:  Lives with wife and 4 sons.   Prior Level of Function:  No prior limitations.  Occupation:  for [employer].     Objective     General Assessments:  Palpation: TTP of entire posterior RTC, posterior shoulder joint, left posterior and mid deltoid. Left teres and infraspinatus on scapula, left levator and upper trap.  TTP and tightness of left paraspinals into suboccipital area  Posture: In seated very rounded shoulders, thoracic kyphosis, and forward head that is all self correctable with cueing.   Functional Assessments:  Pain, antalgia, and cautious with all left shoulder motion over shoulder, behind back and head, and across body.   Slight inferior angle of scapula winging during scaption  Extremity/Trunk Assessments:  Shoulder AROM  Shoulder AROM WFL:  (measured supine)  L Shoulder flexion: (180°): 140  L shoulder ER: (90°): 85  L shoulder IR: (70°): 80  Shoulder PROM  Left shoulder PROM is WNL in all planes with pain at each end range, painful are with flexion and ER    Shoulder Strength  L shoulder flexion: (5/5): 4+ p!!  L shoulder extension: (5/5): 4+ p!!  L shoulder abduction: (5/5): 4 p!!  L shoulder ER: (5/5): 4+ p !!  L shoulder IR: (5/5): 5  Scapular MMT  L lower trapezius: (5/5): 4  L middle trapezius: (5/5): 4+  R serratus anterior: (5/5): 5  L serratus anterior: (5/5): 5  DTR      Shoulder Special  Painful arc: Negative: +  Infraspinatus MMT: Negative: -  Drop Arm Test: Negative: pain  Neer: (Negative): posterior pain  Shoulder Instability: -  Apprehension/Surprise Test: (Negative): -  [place] AC Test: (Negative): -  Crank: (Negative): -    Strength:   position 2: right = 115 lbs, left = 65 lbs.   Right shoulder strength 5/5 globally  Left shoulder strength:   Functional left shoulder movement: limited and painful behind head, across to opposite shoulder and up back.  IR up back right to T7, left to T9 with  pain    More pain on return from flexion/scaption  Negative bicep and bicep tendon testing  Negative supraspinatus special tests   He has non-specific pain with nearly all special tests that is primarily at posterior shoulder joint and RTC interval   Outcome Measures:  Quick Dash = 56.82%    Treatments:  P-A mid to upper thoracic mobs, left G-H joint mobs posterior inferior and anterior glides in varying degrees of shoulder flexion and extension.   Postural training. Educated on self scapular massage with tennis ball  Issued custom HEP and provided handouts.     Goals:  Active       Left shoulder pain       DASH       Start:  03/06/24    Expected End:  05/20/24       Improve DASH score to < 38%         Pain       Start:  03/06/24    Expected End:  05/20/24       Reduce left shoulder pain to < 5/10 at all times; No significant sleep disturbance from pain         Left shoulder AROM       Start:  03/06/24    Expected End:  05/20/24       Left shoulder AROM to WNL in all planes without exacerbation or limitation         strength       Start:  03/06/24    Expected End:  05/20/24       Left shoulder strength to 5/5 in all planes         function       Start:  03/06/24    Expected End:  05/20/24       Pt. Will reach up back, behind head, across to opposite shoulder, and lift > 5 lbs overhead without overt pain or limitation         Patient Stated Goal 1       Start:  03/06/24    Expected End:  05/20/24       Regain function of left arm without shoulder pain; sleep more comfortably.

## 2024-03-13 ENCOUNTER — DOCUMENTATION (OUTPATIENT)
Dept: PHYSICAL THERAPY | Facility: CLINIC | Age: 44
End: 2024-03-13

## 2024-03-13 ENCOUNTER — OFFICE VISIT (OUTPATIENT)
Dept: PODIATRY | Facility: CLINIC | Age: 44
End: 2024-03-13
Payer: COMMERCIAL

## 2024-03-13 DIAGNOSIS — M79.671 FOOT PAIN, BILATERAL: ICD-10-CM

## 2024-03-13 DIAGNOSIS — M79.672 PAIN OF LEFT HEEL: ICD-10-CM

## 2024-03-13 DIAGNOSIS — M79.672 FOOT PAIN, BILATERAL: ICD-10-CM

## 2024-03-13 DIAGNOSIS — M72.2 PLANTAR FASCIITIS OF LEFT FOOT: Primary | ICD-10-CM

## 2024-03-13 PROCEDURE — 99203 OFFICE O/P NEW LOW 30 MIN: CPT | Performed by: PODIATRIST

## 2024-03-13 RX ORDER — IBUPROFEN 800 MG/1
800 TABLET ORAL 3 TIMES DAILY
Qty: 90 TABLET | Refills: 1 | Status: SHIPPED | OUTPATIENT
Start: 2024-03-13 | End: 2024-04-12

## 2024-03-13 NOTE — PROGRESS NOTES
Physical Therapy                 Therapy Communication Note    Patient Name: Mac Bello  MRN: [id number]  Today's Date: 3/13/2024     Discipline: Physical Therapy    Missed Visit Reason:      Missed Time: No Show    Comment:

## 2024-03-13 NOTE — PROGRESS NOTES
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    Review Of Systems:  GENERAL: No weight loss, malaise or fevers.  HEENT: Negative for frequent or significant headaches,   RESPIRATORY: Negative for cough, wheezing or shortness of breath.  CARDIOVASCULAR: Negative for chest pain, leg swelling or palpitations.    Examination of Both Lower Extremities:   Objective:   Vasc: DP and PT pulses are palpable bilateral.  CFT is less than 3 seconds bilateral.  Skin temperature is warm to cool proximal to distal bilateral.      Neuro: Vibratory, light touch and proprioception are intact bilateral.      Derm: Nails 1-5 bilateral are intact.  Skin is supple with normal texture and turgor noted.  Webspaces are clean, dry and intact bilateral. Right med hallux notes HPK tissue. Debrided to smooth skin. No break in skin line noted. No nucleated core.     Ortho: Muscle strength is 5/5 for all pedal groups tested.  Pain to left med garrett tubercle. NO edema, erythema or ecchymosis.     1. Plantar fasciitis of left foot  ibuprofen 800 mg tablet    Referral to Physical Therapy    Custom Orthotics      2. Pain of left heel  Referral to Podiatry    ibuprofen 800 mg tablet    Referral to Physical Therapy    Custom Orthotics      3. Foot pain, bilateral  ibuprofen 800 mg tablet    Referral to Physical Therapy    Custom Orthotics        Patient exam and eval  Discussed Plantar  fasciitis/heel spur of the foot.  Discussed causes, symptoms, aggravating factors and treatment options  Discussed radiographs - revd - 1/3/24  Recommend stiff supportive shoe gear  Shoes that do not twist or bend  Discussed injection to plantar fascia to help decrease pain - deferred at this time  Discussed ice, nsaids, fabián howard/biofreeze  Called in ibu  Referred to PT  Recommend powersteps  Gave rx for custom inserts and list of vendors  Patient to follow up if no improvement noted.   Pt in agreement to plan  All questions answered  Debrided hpk tissue, keep filed down with emily toth.     Massiel Bhakta DPM  [phone]  Option 2  Fax: [phone]

## 2024-03-22 ENCOUNTER — APPOINTMENT (OUTPATIENT)
Dept: PHYSICAL THERAPY | Facility: CLINIC | Age: 44
End: 2024-03-22
Payer: COMMERCIAL

## 2024-03-29 ENCOUNTER — EVALUATION (OUTPATIENT)
Dept: PHYSICAL THERAPY | Facility: CLINIC | Age: 44
End: 2024-03-29
Payer: COMMERCIAL

## 2024-03-29 ENCOUNTER — TREATMENT (OUTPATIENT)
Dept: PHYSICAL THERAPY | Facility: CLINIC | Age: 44
End: 2024-03-29
Payer: COMMERCIAL

## 2024-03-29 DIAGNOSIS — M67.912 ROTATOR CUFF DYSFUNCTION, LEFT: ICD-10-CM

## 2024-03-29 DIAGNOSIS — M79.672 LEFT FOOT PAIN: ICD-10-CM

## 2024-03-29 DIAGNOSIS — M79.672 PAIN OF LEFT HEEL: Primary | ICD-10-CM

## 2024-03-29 DIAGNOSIS — M25.512 ACUTE PAIN OF LEFT SHOULDER: Primary | ICD-10-CM

## 2024-03-29 PROBLEM — M72.2 PLANTAR FASCIITIS OF LEFT FOOT: Status: ACTIVE | Noted: 2024-03-29

## 2024-03-29 PROBLEM — M79.671 FOOT PAIN, BILATERAL: Status: ACTIVE | Noted: 2024-03-29

## 2024-03-29 PROCEDURE — 97140 MANUAL THERAPY 1/> REGIONS: CPT | Mod: GP | Performed by: PHYSICAL THERAPIST

## 2024-03-29 PROCEDURE — 97161 PT EVAL LOW COMPLEX 20 MIN: CPT | Mod: GP

## 2024-03-29 PROCEDURE — 97110 THERAPEUTIC EXERCISES: CPT | Mod: GP | Performed by: PHYSICAL THERAPIST

## 2024-03-29 PROCEDURE — 97110 THERAPEUTIC EXERCISES: CPT | Mod: GP

## 2024-03-29 NOTE — PROGRESS NOTES
Physical Therapy Evaluation    Patient Name: Mac Bello  MRN: [id number]  Today's Date: 3/29/2024  Time Calculation  Start Time: 1042  Stop Time: 1134  Time Calculation (min): 52 min  PT Evaluation Time Entry  PT Evaluation (Low) Time Entry: 32  PT Therapeutic Procedures Time Entry  Therapeutic Exercise Time Entry: 20        Insurance: [employer], $50 co-pay  Plan of Care:  Visit #1    Assessment   Mac Bello is a 43 y.o. who presents with L heel pain, L foot pain located plantar foot medial calcaneus, plantar fascia, MLA, as well as pain posterior to medial malleoli and L anterior lateral ankle. Patient demonstrates pes planus foot posturing, decreased L DF ROM deficit, flexibility deficits, decreased force production, as well as symptoms impacting daily functional mobility. At this time, symptoms are impacting standing, first steps in the morning/after sitting, work duties as well as playing with kids. Patient would benefit from PT interventions to address the stated impairments, improve functional mobility, establish HEP, and return toward prior level of function.    Plan  Treatment/Interventions: Cryotherapy, Education/ Instruction, Hot pack, Manual therapy, Taping techniques, Therapeutic activities, Therapeutic exercises  PT Plan: Skilled PT  PT Frequency: 1 time per week  Duration: 8 visits  Onset Date:  (01/2024; per MD script 03/13/2024)  Rehab Potential: Good  Plan of Care Agreement: Patient      Current Problem  1. Pain of left heel  Referral to Physical Therapy    Follow Up In Physical Therapy      2. Left foot pain  Follow Up In Physical Therapy          General:  General  Reason for Referral: Diagnosis  M79.672 (ICD-10-CM) - Pain of left heel  M72.2 (ICD-10-CM) - Plantar fasciitis of left foot  M79.671,M79.672 (ICD-10-CM) - Foot pain, bilateral  Referred By: Massiel Bhakta DPM  Precautions:  Precautions  STEADI Fall Risk Score (The score of 4 or more indicates an increased risk of falling):  0    Subjective:  Chief complaints: pain in the L foot  Onset Date: 01/2024  Mechanism of Injury: woke up with L foot pain. As soon as I got out the bed to step on the ground it was horrible. Followed up with doctor and had xrays and I have heel spurs, followed up with podiatrist. Referred for PT. Recently purchased shoe with thicker soles. Over the past 3 weeks, pain no less than 5/10. Started taking Ibuprofen this week for pain. Switches between 3 pairs of shoes at work. Plans to get orthotics.  Treatments in the past: stretching, tennis ball rolling foot  Previous History: no history of L foot pain    Pain:  Current: 7/10 Best: 4/10 Worst: 7/10   Location: medial heel plantar, plantar MLA, posterior medial malleoli, anterior ankle   Type: sharp on the heel   Aggravators: tennis ball, standing, first steps in the morning, sitting   Alleviators: tennis ball, staying off of it   Numbness/tingling? no    Function:   Work/Recreation: full time, standing/walking   Prior Level: Indep   Current limitations: standing, first steps in the morning, sitting and first steps after sitting, playing with kids   Condition: Worsening     Home Situation:    Stairs: 14 steps with handrail    Sleep: no issues     Goals for Therapy:    To be able to use my feet again    Objective   Gait: Independent ambulation  Posture: decreased WB L LE in standing, B ER position, decreased MLA, too many toes  Palpation: (+) medial plantar calcaneus, plantar fascia medially, medial posterior to malleoli, anterior lateral ankle  Decreased L gastroc soleus complex, plantar fascia, hamstrings    L Knee AROM:  Flexion: 125 deg  Extension: 0 deg    Ankle & Foot AROM:  Dorsiflexion: R 5 deg, L 0 deg  Plantarflexion: R 40 deg, L 26 deg P!  Inversion: R 25 deg, L 23 deg  Eversion: R 5 deg, L 5 deg    Hip Strength:  Flexion: 4+/5 B    Knee Strength: 5/5 L knee, 4/5 R    L Ankle & Foot Strength:  Dorsiflexion: 4-/5   Plantarflexion: SL heel raise 13 R, 3  L  Inversion: 4-/5   Eversion: 4-/5 P!    Special Tests: (+) ashley    Outcome Measures:  Lower Extremity Functional Scale (LEFS): 40    Treatment:  Therapeutic Exercise:  Supine gastroc stretch with strap, seated soleus stretch with strap, off step stretch, seated PF stretch, reviewed off step stretch. Education on footwear support, shoe wear. L PF taping to assist with management of pain and symptoms. Patient aware to remove if adverse reaction arises.    Goals:  Active       PT Problem       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, caregiver duties without adverse reaction on average       Start:  03/29/24    Expected End:  06/01/24            Patient will improve L ankle DF by 5 deg to reduce tightness and increase mobility and gait       Start:  03/29/24    Expected End:  06/01/24            Patient will stand, walk with minimal to no pain on average, 4/10 at worst       Start:  03/29/24    Expected End:  06/01/24            Patient will improve L ankle strength to 5/5 throughout; improve single leg heel raises by 5-10 reps with UE support       Start:  03/29/24    Expected End:  06/01/24            Patient will demonstrate independence and compliance with HEP and self management       Start:  03/29/24    Expected End:  06/01/24            Patient will improve LEFS score by 9 points to meet minimal detectable change of improvement to demonstrate increased functional mobility       Start:  03/29/24    Expected End:  06/01/24

## 2024-03-29 NOTE — PROGRESS NOTES
"Physical Therapy    Physical Therapy Treatment    Patient Name: Mac Bello  MRN: [id number]  Today's Date: 3/29/2024  Time Calculation  Start Time: 0815  Stop Time: 0900  Time Calculation (min): 45 min        PT Therapeutic Procedures Time Entry  Manual Therapy Time Entry: 15  Therapeutic Exercise Time Entry: 30         Assessment:  He has RTC insertion discomfort during ER PREs at 0 and 90.  Stopped prone scaption due to sharp anterior shoulder pain. He notes relief from soft tissue work and shoulder mobs.   We discussed dry needling (TDN), and he wishes to undergo TDN with me in the near future. TDN will likely be successful at reducing trigger points and reducing myofascial pain.     Plan:  Continue skilled PT to reduce pain, improve posture, increase pain free AROM, restore normal left shoulder function, and improve his quality of life.     Primary Dx: acute pain of left shoulder  Current Problem  1. Acute pain of left shoulder  Follow Up In Physical Therapy      2. Rotator cuff dysfunction, left  Follow Up In Physical Therapy          General  Referral: Leticia Thakkar CNP     Subjective    Precautions  No precautions     Pain  Left shoulder pain is 5-6/10. \"I was so frustrated on the way here because of work so I wasn't really feeling pain this morning\"    Objective     Treatments:  - seated UBE 2 mins fwd/ 2 mins bkd.   - standing bilateral shoulder ER at 0. Red TB. 2 x 15  - prone horizontal abduction. Neutral 2 x 10. 1 lbs (unable to assume full ER rotated position)  - prone horizontal abduction. Thumbs up 2 x 15. 1lbs  - prone Ws scapular retraction. 2 x 10  - Left Prone scaption. x 10. Stopped d/t pain    Manual Therapy:  MFR/TPR to left RTC on scapula and posterior shoulder interval, bicep tendon  Grade IV   OP EDUCATION:       Goals:  Active       Left shoulder pain       DASH       Start:  03/06/24    Expected End:  05/20/24       Improve DASH score to < 38%         Pain       Start:  03/06/24    " Expected End:  05/20/24       Reduce left shoulder pain to < 5/10 at all times; No significant sleep disturbance from pain         Left shoulder AROM       Start:  03/06/24    Expected End:  05/20/24       Left shoulder AROM to WNL in all planes without exacerbation or limitation         strength       Start:  03/06/24    Expected End:  05/20/24       Left shoulder strength to 5/5 in all planes         function       Start:  03/06/24    Expected End:  05/20/24       Pt. Will reach up back, behind head, across to opposite shoulder, and lift > 5 lbs overhead without overt pain or limitation         Patient Stated Goal 1       Start:  03/06/24    Expected End:  05/20/24       Regain function of left arm without shoulder pain; sleep more comfortably.

## 2024-04-03 ENCOUNTER — DOCUMENTATION (OUTPATIENT)
Dept: PHYSICAL THERAPY | Facility: CLINIC | Age: 44
End: 2024-04-03
Payer: COMMERCIAL

## 2024-04-03 NOTE — PROGRESS NOTES
Physical Therapy                 Therapy Communication Note    Patient Name: Mac Bello  MRN: [id number]  Today's Date: 4/3/2024     Discipline: Physical Therapy    Missed Visit Reason:  No Show 04/03/2024    Missed Time: No Show

## 2024-04-04 ENCOUNTER — TELEPHONE (OUTPATIENT)
Dept: PRIMARY CARE | Facility: CLINIC | Age: 44
End: 2024-04-04
Payer: COMMERCIAL

## 2024-04-04 DIAGNOSIS — E03.9 HYPOTHYROIDISM, UNSPECIFIED TYPE: ICD-10-CM

## 2024-04-04 DIAGNOSIS — E03.9 HYPOTHYROIDISM, UNSPECIFIED TYPE: Primary | ICD-10-CM

## 2024-04-04 DIAGNOSIS — E78.2 MIXED HYPERLIPIDEMIA: ICD-10-CM

## 2024-04-04 RX ORDER — ROSUVASTATIN CALCIUM 20 MG/1
20 TABLET, COATED ORAL DAILY
Qty: 90 TABLET | Refills: 0 | Status: SHIPPED | OUTPATIENT
Start: 2024-04-04

## 2024-04-04 RX ORDER — LEVOTHYROXINE SODIUM 50 UG/1
50 TABLET ORAL
Qty: 30 TABLET | Refills: 0 | Status: SHIPPED | OUTPATIENT
Start: 2024-04-04 | End: 2024-05-10 | Stop reason: DRUGHIGH

## 2024-04-04 RX ORDER — LEVOTHYROXINE SODIUM 200 UG/1
TABLET ORAL
Qty: 30 TABLET | Refills: 0 | Status: SHIPPED | OUTPATIENT
Start: 2024-04-04 | End: 2024-05-10 | Stop reason: DRUGHIGH

## 2024-04-05 ENCOUNTER — TREATMENT (OUTPATIENT)
Dept: PHYSICAL THERAPY | Facility: CLINIC | Age: 44
End: 2024-04-05

## 2024-04-05 DIAGNOSIS — M25.512 ACUTE PAIN OF LEFT SHOULDER: ICD-10-CM

## 2024-04-05 DIAGNOSIS — M67.912 ROTATOR CUFF DYSFUNCTION, LEFT: ICD-10-CM

## 2024-04-05 PROCEDURE — 4200000004 HC PT PHASE II 15 MIN CHG: Mod: GP | Performed by: PHYSICAL THERAPIST

## 2024-04-05 NOTE — PROGRESS NOTES
Physical Therapy    Physical Therapy Treatment    Patient Name: Mac Bello  MRN: [id number]  Today's Date: 4/5/2024  Time Calculation  Start Time: 0815  Stop Time: 0845  Time Calculation (min): 30 min        Cash Pay Phase II dry needling  Dry needling visit: 1  Assessment:  No adverse reaction to TDN.  Numerous twitch responses elicited, especially with teres, infraspinatus and bicep. Patient notes some soreness after TDN, but also notes immediate relief.  He was pleased that he could push himself up from prone without left shoulder pain. This movement has been very painful lately.   Plan:  Continue TDN as indicated, and as requested by patient.     Current Problem  1. Acute pain of left shoulder  Follow Up In Physical Therapy      2. Rotator cuff dysfunction, left  Follow Up In Physical Therapy          General  Patient wishes to undergo dry needling to address his left shoulder pain. He completed the dry needling agreement form and questionnaire. Patient verbally consents to dry needling.     Subjective    Precautions  none    Pain  Fairly constant pain of left shoulder. Lateral to posterior deltoid area and posterior shoulder. Currently 5/10.     Objective   Treatments:  Performed dry needling (TDN) to left posterior and middle deltoid, proximal bicep, teres minor and infraspinatus, and posterior RTC interval at posterior glenoid region.     OP EDUCATION:       Goals:

## 2024-04-10 ENCOUNTER — TREATMENT (OUTPATIENT)
Dept: PHYSICAL THERAPY | Facility: CLINIC | Age: 44
End: 2024-04-10
Payer: COMMERCIAL

## 2024-04-10 DIAGNOSIS — M79.672 PAIN OF LEFT HEEL: Primary | ICD-10-CM

## 2024-04-10 DIAGNOSIS — M79.672 LEFT FOOT PAIN: ICD-10-CM

## 2024-04-10 PROCEDURE — 97110 THERAPEUTIC EXERCISES: CPT | Mod: GP

## 2024-04-10 NOTE — PROGRESS NOTES
Physical Therapy    Physical Therapy Treatment    Patient Name: Mac Bello  MRN: [id number]  Today's Date: 4/10/2024  Time Calculation  Start Time: 0832  Stop Time: 0913  Time Calculation (min): 41 min  Visit: 2      PT Therapeutic Procedures Time Entry  Therapeutic Exercise Time Entry: 41          Assessment:   Patient arrives >15 minutes late to scheduled appointment, accommodated. Favorable response with PF taping between sessions, continued taping to assist with management of symptoms and patient plans to get night splint. Progressed with therapeutic exercises without adverse reaction focusing on mobility and intrinsic strengthening. Encouraged daily HEP to maximize benefits of therapy. Feels better at end of treatment, unrated pain level.    Plan:   Continue with PT POC. Progress with strengthening. Add manual next.    Current Problem  1. Pain of left heel  Follow Up In Physical Therapy      2. Left foot pain  Follow Up In Physical Therapy          General   **referred for L heel pain, L foot      Subjective    5-6/10 L heel pain coming out of bed this morning, felt really tight.  Taping helped the last visit.   HEP every other day.    Pain   0/10 current pain    Objective     Treatments:  Therapeutic Exercise:  Scifit bike x 5 mins ROM/warm-up/strength  Step stretch x 20 reps ea  Off step stretch 3 x 30 sec  Standing gastroc stretch 3 x 30 sec  Standing soleus stretch 3 x 30 sec  Seated PF stretch reviewed  Seated hallux extension, lesser toe extension, toe splays, arch raises, towel curls/reverse curls x 20 reps ea  L PF taping to assist with management of pain and symptoms. Patient aware to remove if adverse reaction arises.    OP EDUCATION:   Reviewed on footwear support, shoe wear, night splint    Goals:  Active       PT Problem       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, caregiver duties without adverse reaction on average       Start:  03/29/24    Expected End:  06/01/24            Patient will improve L  ankle DF by 5 deg to reduce tightness and increase mobility and gait       Start:  03/29/24    Expected End:  06/01/24            Patient will stand, walk with minimal to no pain on average, 4/10 at worst       Start:  03/29/24    Expected End:  06/01/24            Patient will improve L ankle strength to 5/5 throughout; improve single leg heel raises by 5-10 reps with UE support       Start:  03/29/24    Expected End:  06/01/24            Patient will demonstrate independence and compliance with HEP and self management       Start:  03/29/24    Expected End:  06/01/24            Patient will improve LEFS score by 9 points to meet minimal detectable change of improvement to demonstrate increased functional mobility       Start:  03/29/24    Expected End:  06/01/24

## 2024-04-12 ENCOUNTER — CLINICAL SUPPORT (OUTPATIENT)
Dept: PHYSICAL THERAPY | Facility: CLINIC | Age: 44
End: 2024-04-12
Payer: COMMERCIAL

## 2024-04-12 DIAGNOSIS — M67.912 ROTATOR CUFF DYSFUNCTION, LEFT: ICD-10-CM

## 2024-04-12 DIAGNOSIS — M25.512 ACUTE PAIN OF LEFT SHOULDER: ICD-10-CM

## 2024-04-12 PROCEDURE — 4200000004 HC PT PHASE II 15 MIN CHG: Mod: GP | Performed by: PHYSICAL THERAPIST

## 2024-04-12 NOTE — PROGRESS NOTES
Physical Therapy    Physical Therapy Treatment    Patient Name: Mac Bello  MRN: [id number]  Today's Date: 4/12/2024  Time Calculation  Start Time: 0825  Stop Time: 0855  Time Calculation (min): 30 min        Cash Pay Phase II dry needling  Dry needling visit: 2    Assessment:  No adverse reaction to TDN.  Numerous twitch responses elicited. Most TDN treatment was focused on posterior shoulder musculature and cuff. We will monitor his response to TDN and proceed as indicated.   Plan:  Continue TDN as indicated, and as requested by patient.     Current Problem  1. Acute pain of left shoulder  Follow Up In Physical Therapy      2. Rotator cuff dysfunction, left  Follow Up In Physical Therapy          General  Patient wishes to undergo dry needling to address his left shoulder pain. He completed the dry needling agreement form and questionnaire. Patient verbally consents to dry needling.     Subjective    Precautions  none    Pain  He had good pain relief for 5-6 days following dry needling.  He had no pain greater than a 6/10 over the last week. The left shoulder felt so good that I didn't want to do anything with it.     Objective   Treatments:  Performed dry needling (TDN) to left posterior and middle deltoid, tricep, proximal bicep, teres minor and infraspinatus, and posterior RTC interval at posterior glenoid region.     OP EDUCATION:       Goals:  Active       Left shoulder pain       DASH       Start:  03/06/24    Expected End:  05/20/24       Improve DASH score to < 38%         Pain       Start:  03/06/24    Expected End:  05/20/24       Reduce left shoulder pain to < 5/10 at all times; No significant sleep disturbance from pain         Left shoulder AROM       Start:  03/06/24    Expected End:  05/20/24       Left shoulder AROM to WNL in all planes without exacerbation or limitation         strength       Start:  03/06/24    Expected End:  05/20/24       Left shoulder strength to 5/5 in all planes          function       Start:  03/06/24    Expected End:  05/20/24       Pt. Will reach up back, behind head, across to opposite shoulder, and lift > 5 lbs overhead without overt pain or limitation         Patient Stated Goal 1       Start:  03/06/24    Expected End:  05/20/24       Regain function of left arm without shoulder pain; sleep more comfortably.

## 2024-04-15 ENCOUNTER — OFFICE VISIT (OUTPATIENT)
Dept: PODIATRY | Facility: CLINIC | Age: 44
End: 2024-04-15
Payer: COMMERCIAL

## 2024-04-15 DIAGNOSIS — M79.672 PAIN OF LEFT HEEL: ICD-10-CM

## 2024-04-15 DIAGNOSIS — M79.671 FOOT PAIN, BILATERAL: ICD-10-CM

## 2024-04-15 DIAGNOSIS — M79.672 FOOT PAIN, BILATERAL: ICD-10-CM

## 2024-04-15 DIAGNOSIS — M72.2 PLANTAR FASCIITIS OF LEFT FOOT: Primary | ICD-10-CM

## 2024-04-15 PROCEDURE — 99212 OFFICE O/P EST SF 10 MIN: CPT | Performed by: PODIATRIST

## 2024-04-15 NOTE — PROGRESS NOTES
History of Present Illness:   Patient states they are here for follow up  States he has been going to PT  Has noticed improvement in pain  Has had foot taped by PT  Denies any other further issues  Pleased with progress  No other pedal complaints    Past Medical History  Past Medical History:   Diagnosis Date    Flat foot (pes planus) (acquired), unspecified foot 03/10/2015    Pes planus    Hyperlipidemia, unspecified 09/21/2022    Hyperlipidemia    Hypothyroidism, unspecified 12/30/2022    Hypothyroidism    Low back pain, unspecified 03/09/2022    Low back pain       Medications and Allergies have been reviewed.    Review Of Systems:  GENERAL: No weight loss, malaise or fevers.  HEENT: Negative for frequent or significant headaches,   RESPIRATORY: Negative for cough, wheezing or shortness of breath.  CARDIOVASCULAR: Negative for chest pain, leg swelling or palpitations.    Examination of Both Lower Extremities:     Objective:   Vasc: DP and PT pulses are palpable bilateral.  CFT is less than 3 seconds bilateral.  Skin temperature is warm to cool proximal to distal bilateral.      Neuro: Vibratory, light touch and proprioception are intact bilateral.      Derm: Nails 1-5 bilateral are intact.  Skin is supple with normal texture and turgor noted.  Webspaces are clean, dry and intact bilateral. Right med hallux notes HPK tissue. Debrided to smooth skin. No break in skin line noted. No nucleated core.     Ortho: Muscle strength is 5/5 for all pedal groups tested.  Pain to left med garrett tubercle. NO edema, erythema or ecchymosis.     1. Plantar fasciitis of left foot  ibuprofen 800 mg tablet    Referral to Physical Therapy    [employer]      2. Pain of left heel  Referral to Podiatry    ibuprofen 800 mg tablet    Referral to Physical Therapy    [facility]      3. Foot pain, bilateral  ibuprofen 800 mg tablet    Referral to Physical Therapy    [facility]        Patient exam and eval  Pleased patient is  improving  Continue current treatment  Pt asked about inserts - gave rx last visit, re printed for patient  Discussed OTC inserts  Discussed shoes that do not twist or bend  Fu prn  Pt in agreement to plan  All questions answered      Massiel Bhakta DPM  [phone]  [facility]  Fax: [phone]

## 2024-04-19 ENCOUNTER — DOCUMENTATION (OUTPATIENT)
Dept: PHYSICAL THERAPY | Facility: CLINIC | Age: 44
End: 2024-04-19
Payer: COMMERCIAL

## 2024-04-19 ENCOUNTER — APPOINTMENT (OUTPATIENT)
Dept: PHYSICAL THERAPY | Facility: CLINIC | Age: 44
End: 2024-04-19
Payer: COMMERCIAL

## 2024-04-19 NOTE — PROGRESS NOTES
Physical Therapy                 Therapy Communication Note    Patient Name: Mac Bello  MRN: [id number]  Today's Date: 4/19/2024     Discipline: Physical Therapy    Missed Visit Reason:  Canceled, no reason given.     Missed Time: Cancel    Comment:

## 2024-04-24 ENCOUNTER — TREATMENT (OUTPATIENT)
Dept: PHYSICAL THERAPY | Facility: CLINIC | Age: 44
End: 2024-04-24
Payer: COMMERCIAL

## 2024-04-24 DIAGNOSIS — M79.672 LEFT FOOT PAIN: ICD-10-CM

## 2024-04-24 DIAGNOSIS — M79.672 PAIN OF LEFT HEEL: ICD-10-CM

## 2024-04-24 PROCEDURE — 97110 THERAPEUTIC EXERCISES: CPT | Mod: GP,CQ

## 2024-04-24 PROCEDURE — 97140 MANUAL THERAPY 1/> REGIONS: CPT | Mod: GP,CQ

## 2024-04-24 ASSESSMENT — PAIN SCALES - GENERAL: PAINLEVEL_OUTOF10: 5 - MODERATE PAIN

## 2024-04-24 ASSESSMENT — PAIN - FUNCTIONAL ASSESSMENT
PAIN_FUNCTIONAL_ASSESSMENT: 0-10
PAIN_FUNCTIONAL_ASSESSMENT: 0-10

## 2024-04-24 NOTE — PROGRESS NOTES
Physical Therapy    Physical Therapy Treatment    Patient Name: Mac Bello  MRN: [id number]  Today's Date: 4/24/2024  Time Calculation  Start Time: 0845  Stop Time: 0930  Time Calculation (min): 45 min  Visit: 3      PT Therapeutic Procedures Time Entry  Manual Therapy Time Entry: 10  Therapeutic Exercise Time Entry: 35        Assessment:  PT Assessment  Evaluation/Treatment Tolerance: Patient tolerated treatment well  Good response to all stretching activities; also added calf raises, sls, and resistive ankle strengthening; eccentric muscle shaking with df, inv, ev during strengthening with GTB. No reports of pains or discomforts during any ther-ex. IA Manual introduced and received well by the patient, states feels good during, when completed felt his heel pain had reduced but added minimal agitation through mid foot. Heel pain around 3/10.     Plan:   Continue with PT POC.    Current Problem  1. Pain of left heel  Follow Up In Physical Therapy      2. Left foot pain  Follow Up In Physical Therapy        General   **referred for L heel pain, L foot      Subjective    Having 5/10 pain in the left foot towards the heel, pain worse yesterday as he was out of town walking more; did stretching when at home which helped lessen pain. Partially compliant with HEP, although does do quick stretching every morning. No other updates or concerns at this time.     Pain  Pain Assessment  Pain Assessment: 0-10  Pain Score: 5 - Moderate pain  0/10 current pain    Objective     Treatments:  Therapeutic Exercise:  Scifit bike x 5 mins ROM/warm-up/strength  Step stretch x 20 reps ea  Off step stretch 3 x 30 sec  Standing gastroc stretch 3 x 30 sec  Standing soleus stretch 3 x 30 sec  SLS on foam with left, alternating right leg step taps 5 x 20 sec  Calf raise on foam 1-2 sec pause at top x 20  Seated hallux extension,  lesser toe extension, toe curls, arch raises x 20  Gtb INV/EVER/DF x 15 each    Manual:   IASTM x 8 min to left  plantar     OP EDUCATION:   Reviewed on footwear support, shoe wear, night splint  Goals:  Active       PT Problem       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, caregiver duties without adverse reaction on average       Start:  03/29/24    Expected End:  06/01/24            Patient will improve L ankle DF by 5 deg to reduce tightness and increase mobility and gait       Start:  03/29/24    Expected End:  06/01/24            Patient will stand, walk with minimal to no pain on average, 4/10 at worst       Start:  03/29/24    Expected End:  06/01/24            Patient will improve L ankle strength to 5/5 throughout; improve single leg heel raises by 5-10 reps with UE support       Start:  03/29/24    Expected End:  06/01/24            Patient will demonstrate independence and compliance with HEP and self management       Start:  03/29/24    Expected End:  06/01/24            Patient will improve LEFS score by 9 points to meet minimal detectable change of improvement to demonstrate increased functional mobility       Start:  03/29/24    Expected End:  06/01/24

## 2024-04-26 ENCOUNTER — TREATMENT (OUTPATIENT)
Dept: PHYSICAL THERAPY | Facility: CLINIC | Age: 44
End: 2024-04-26
Payer: COMMERCIAL

## 2024-04-26 DIAGNOSIS — M67.912 ROTATOR CUFF DYSFUNCTION, LEFT: ICD-10-CM

## 2024-04-26 DIAGNOSIS — M25.512 ACUTE PAIN OF LEFT SHOULDER: ICD-10-CM

## 2024-04-26 DIAGNOSIS — M79.672 PAIN OF LEFT HEEL: ICD-10-CM

## 2024-04-26 DIAGNOSIS — M79.672 LEFT FOOT PAIN: ICD-10-CM

## 2024-04-26 PROCEDURE — 4200000004 HC PT PHASE II 15 MIN CHG: Mod: GP | Performed by: PHYSICAL THERAPIST

## 2024-04-26 PROCEDURE — 97140 MANUAL THERAPY 1/> REGIONS: CPT | Mod: GP

## 2024-04-26 PROCEDURE — 97110 THERAPEUTIC EXERCISES: CPT | Mod: GP

## 2024-04-26 NOTE — PROGRESS NOTES
Physical Therapy    Physical Therapy Treatment    Patient Name: Mac Bello  MRN: [id number]  Today's Date: 4/26/2024  Time Calculation  Start Time: 0900  Stop Time: 0930  Time Calculation (min): 30 min        Cash Pay Phase II dry needling  Dry needling visit: 3    Assessment:  No adverse reaction to TDN.  Numerous twitch responses elicited. Greatest pain is at posterior to middle deltoid area. We will monitor his response to TDN and proceed as indicated.   Issued left shoulder strengthening program that he will do every 2-3 days and begin in 3 days.   Plan:  Continue TDN as indicated, and as requested by patient.     Current Problem  1. Acute pain of left shoulder  Follow Up In Physical Therapy      2. Rotator cuff dysfunction, left  Follow Up In Physical Therapy          General  Patient wishes to undergo dry needling to address his left shoulder pain. He completed the dry needling agreement form and questionnaire. Patient verbally consents to dry needling.     Subjective    Precautions  none    Pain  He had good pain relief for 5-6 days following dry needling.  Overall his left shoulder pain is improved, especially at rest. Baseline pain is reduced, but he still has left deltoid / RTC insertional pain with left shoulder movements. Currently left deltoid region pain is 4/10 with movement.    Objective   Treatments:  Performed dry needling (TDN) to left , anterior, posterior and middle deltoid, proximal bicep, teres minor and infraspinatus, and posterior RTC interval at posterior glenoid region.     OP EDUCATION:   Issued left shoulder strengthening program that he will do every 2-3 days and begin in 3 days.     Goals:

## 2024-05-01 ENCOUNTER — TREATMENT (OUTPATIENT)
Dept: PHYSICAL THERAPY | Facility: CLINIC | Age: 44
End: 2024-05-01
Payer: COMMERCIAL

## 2024-05-01 DIAGNOSIS — M79.672 LEFT FOOT PAIN: ICD-10-CM

## 2024-05-01 DIAGNOSIS — M79.672 PAIN OF LEFT HEEL: ICD-10-CM

## 2024-05-01 PROCEDURE — 97110 THERAPEUTIC EXERCISES: CPT | Mod: GP,CQ

## 2024-05-01 PROCEDURE — 97140 MANUAL THERAPY 1/> REGIONS: CPT | Mod: GP,CQ

## 2024-05-01 ASSESSMENT — PAIN - FUNCTIONAL ASSESSMENT: PAIN_FUNCTIONAL_ASSESSMENT: 0-10

## 2024-05-01 ASSESSMENT — PAIN SCALES - GENERAL: PAINLEVEL_OUTOF10: 4

## 2024-05-01 NOTE — PROGRESS NOTES
Physical Therapy    Physical Therapy Treatment    Patient Name: Mac Bello  MRN: [id number]  Today's Date: 5/1/2024  Time Calculation  Start Time: 0845  Stop Time: 0930  Time Calculation (min): 45 min  Visit: 5     PT Therapeutic Procedures Time Entry  Manual Therapy Time Entry: 10  Therapeutic Exercise Time Entry: 35        Assessment:  PT Assessment  Evaluation/Treatment Tolerance: Patient tolerated treatment well  Patient continues to respond well to his treatment sessions. Slight increase of difficulty today with SLS on foam, added additional cone behind to further challenge ankle stability, short rest break taken during. Muscular shaking remains present with thera band resistance strengthening. Continues to respond well to manual with tool assistance, slight soreness afterwards but overall less tension when leaving today.     Plan:   Continue with PT POC.    Current Problem  1. Pain of left heel  Follow Up In Physical Therapy      2. Left foot pain  Follow Up In Physical Therapy      General   **referred for L heel pain, L foot      Subjective    Doing fairly well today. Pain at a 4/10. Feels the manual to the foot has been helping to loosen it up and lessen pain. HEP performed intermittently through out the work day.     *left foot pain    Pain  Pain Assessment  Pain Assessment: 0-10  Pain Score: 4  4/10 current pain    Objective     Treatments: x 35 min  Therapeutic Exercise:  Scifit bike L2 x 6 mins ROM/warm-up/strength  Step calf stretch x 25 reps ea  Standing arch raises x 20  SLS on foam/left foot taping two cones in front and one behind in // bar x 20  Calf raise on foam 1-2 sec pause at top x 25 reps  Standing incline board soleus stretch 3 x 30 sec  Gtb INV/EVER/DF x 15 each     Manual: x 10 min  IASTM to left plantar fascia and heel    Cross friction massage, MFR, DTM L plantar foot  L PF taping to assist with management of pain and symptoms. 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, caregiver duties without adverse reaction on average       Start:  03/29/24    Expected End:  06/01/24            Patient will improve L ankle DF by 5 deg to reduce tightness and increase mobility and gait       Start:  03/29/24    Expected End:  06/01/24            Patient will stand, walk with minimal to no pain on average, 4/10 at worst       Start:  03/29/24    Expected End:  06/01/24            Patient will improve L ankle strength to 5/5 throughout; improve single leg heel raises by 5-10 reps with UE support       Start:  03/29/24    Expected End:  06/01/24            Patient will demonstrate independence and compliance with HEP and self management       Start:  03/29/24    Expected End:  06/01/24            Patient will improve LEFS score by 9 points to meet minimal detectable change of improvement to demonstrate increased functional mobility       Start:  03/29/24    Expected End:  06/01/24

## 2024-05-08 ENCOUNTER — CLINICAL SUPPORT (OUTPATIENT)
Dept: PRIMARY CARE | Facility: CLINIC | Age: 44
End: 2024-05-08
Payer: COMMERCIAL

## 2024-05-08 ENCOUNTER — APPOINTMENT (OUTPATIENT)
Dept: PHYSICAL THERAPY | Facility: CLINIC | Age: 44
End: 2024-05-08
Payer: COMMERCIAL

## 2024-05-08 DIAGNOSIS — E03.9 HYPOTHYROIDISM, UNSPECIFIED TYPE: ICD-10-CM

## 2024-05-08 PROCEDURE — 36415 COLL VENOUS BLD VENIPUNCTURE: CPT

## 2024-05-08 PROCEDURE — 84443 ASSAY THYROID STIM HORMONE: CPT

## 2024-05-09 DIAGNOSIS — E03.8 HYPOTHYROIDISM DUE TO HASHIMOTO'S THYROIDITIS: Primary | ICD-10-CM

## 2024-05-09 DIAGNOSIS — E06.3 HYPOTHYROIDISM DUE TO HASHIMOTO'S THYROIDITIS: Primary | ICD-10-CM

## 2024-05-09 LAB — TSH SERPL-ACNC: >120 MIU/L (ref 0.44–3.98)

## 2024-05-10 ENCOUNTER — TREATMENT (OUTPATIENT)
Dept: PHYSICAL THERAPY | Facility: CLINIC | Age: 44
End: 2024-05-10
Payer: COMMERCIAL

## 2024-05-10 DIAGNOSIS — M67.912 ROTATOR CUFF DYSFUNCTION, LEFT: ICD-10-CM

## 2024-05-10 DIAGNOSIS — E03.9 HYPOTHYROIDISM, UNSPECIFIED TYPE: Primary | ICD-10-CM

## 2024-05-10 DIAGNOSIS — M25.512 ACUTE PAIN OF LEFT SHOULDER: Primary | ICD-10-CM

## 2024-05-10 PROCEDURE — 4200000004 HC PT PHASE II 15 MIN CHG: Mod: GP | Performed by: PHYSICAL THERAPIST

## 2024-05-10 RX ORDER — LEVOTHYROXINE SODIUM 150 UG/1
150 TABLET ORAL
Qty: 60 TABLET | Refills: 0 | Status: SHIPPED | OUTPATIENT
Start: 2024-05-10

## 2024-05-10 NOTE — PROGRESS NOTES
Physical Therapy    Physical Therapy Treatment    Patient Name: Mac Bello  MRN: [id number]  Today's Date: 5/10/2024  Time Calculation  Start Time: 0850  Stop Time: 0915  Time Calculation (min): 25 min        Cash Pay Phase II dry needling  Dry needling visit: 4    Assessment:  Unfortunately his left shoulder pain has been worse since throwing a ball and playing with his kids last Sunday. He reports a few days of pain relief after last TDN session.  No adverse reaction to TDN.  Numerous twitch responses elicited.  We will monitor his response to TDN and proceed as indicated.   Issued left shoulder strengthening program that he will do every 2-3 days and begin in 3 days.   Plan:  Continue TDN as indicated, and as requested by patient.     Current Problem  1. Acute pain of left shoulder        2. Rotator cuff dysfunction, left              General  Patient wishes to undergo dry needling to address his left shoulder pain. He completed the dry needling agreement form and questionnaire. Patient verbally consents to dry needling.     Subjective    Precautions  none    Pain  Left shoulder pain is 4/10 today. The pain has been worse since last Sunday when he threw a ball and played with his kids. On Wednesday the pain was worse because he was more active - pain up to 12/10.  He had good pain relief for 5-6 days following dry needling.    Overall his left shoulder pain is improved, especially at rest. Baseline pain is reduced, but he still has left deltoid / RTC insertional pain with left shoulder movements. Currently left deltoid region pain is 4/10 with movement.    Objective   Treatments:  Performed dry needling (TDN) to left , anterior, posterior and middle deltoid,, teres minor and infraspinatus, and posterior RTC interval at posterior glenoid region.     OP EDUCATION:   Issued left shoulder strengthening program that he will do every 2-3 days and begin in 3 days.     Goals:

## 2024-05-15 ENCOUNTER — TREATMENT (OUTPATIENT)
Dept: PHYSICAL THERAPY | Facility: CLINIC | Age: 44
End: 2024-05-15
Payer: COMMERCIAL

## 2024-05-15 DIAGNOSIS — M79.672 LEFT FOOT PAIN: ICD-10-CM

## 2024-05-15 DIAGNOSIS — M79.672 PAIN OF LEFT HEEL: Primary | ICD-10-CM

## 2024-05-15 PROCEDURE — 97110 THERAPEUTIC EXERCISES: CPT | Mod: GP,CQ

## 2024-05-15 PROCEDURE — 97140 MANUAL THERAPY 1/> REGIONS: CPT | Mod: GP,CQ

## 2024-05-15 ASSESSMENT — PAIN - FUNCTIONAL ASSESSMENT: PAIN_FUNCTIONAL_ASSESSMENT: 0-10

## 2024-05-15 ASSESSMENT — PAIN SCALES - GENERAL: PAINLEVEL_OUTOF10: 3

## 2024-05-15 NOTE — PROGRESS NOTES
"Physical Therapy    Physical Therapy Treatment    Patient Name: Mac Bello  MRN: [id number]  Today's Date: 5/15/2024  Time Calculation  Start Time: 0845  Stop Time: 0930  Time Calculation (min): 45 min  Visit: 6     PT Therapeutic Procedures Time Entry  Manual Therapy Time Entry: 10  Therapeutic Exercise Time Entry: 35          Assessment:  PT Assessment  Evaluation/Treatment Tolerance: Patient tolerated treatment well  Patient performs well with today's given treatment session, no reported painful increases. Balance platform into full DF/PF slightly challenging. Responds well to the challenge of increase reps/form with three way calf raise, breaks between sets. Manual with positive outcome, more time spent with instrument through heel. Leaves today with currently no pain and less stiffness in the foot/heel.     Plan:   Continue with PT POC.  Current Problem  1. Pain of left heel  Follow Up In Physical Therapy      2. Left foot pain  Follow Up In Physical Therapy      General   **referred for L heel pain, L foot      Subjective    Doing well today. Having 3/10 pain, closer to heel. Having increased tightness in the mornings and after work when first getting back out of his car. NO other updates at this time.     *left foot pain    Pain  Pain Assessment  Pain Assessment: 0-10  Pain Score: 3  3/10 current pain    Objective     Treatments: x 35 min  Therapeutic Exercise:  Step calf stretch x 25 reps ea  Three way calf raise x 15 each (short break between each)  SLS with multi direction ball toss 3 x 30 sec bouts  Soleus raises x 20  Standing arch raises with toe curl x 20  8\" box step up single leg x 20  Balance platform DF/PF x 20    Manual: x 10 min  IASTM to left plantar fascia and heel  Cross friction massage, MFR, DTM L plantar foot    OP EDUCATION:   HEP handout + green TB  Goals:  Active       PT Problem       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, caregiver duties without adverse reaction on average       Start:  " 03/29/24    Expected End:  06/01/24            Patient will improve L ankle DF by 5 deg to reduce tightness and increase mobility and gait       Start:  03/29/24    Expected End:  06/01/24            Patient will stand, walk with minimal to no pain on average, 4/10 at worst       Start:  03/29/24    Expected End:  06/01/24            Patient will improve L ankle strength to 5/5 throughout; improve single leg heel raises by 5-10 reps with UE support       Start:  03/29/24    Expected End:  06/01/24            Patient will demonstrate independence and compliance with HEP and self management       Start:  03/29/24    Expected End:  06/01/24            Patient will improve LEFS score by 9 points to meet minimal detectable change of improvement to demonstrate increased functional mobility       Start:  03/29/24    Expected End:  06/01/24               Stable, no chest pain.  continued Hematuria

## 2024-05-22 ENCOUNTER — TREATMENT (OUTPATIENT)
Dept: PHYSICAL THERAPY | Facility: CLINIC | Age: 44
End: 2024-05-22
Payer: COMMERCIAL

## 2024-05-22 DIAGNOSIS — M79.672 PAIN OF LEFT HEEL: Primary | ICD-10-CM

## 2024-05-22 DIAGNOSIS — M79.672 LEFT FOOT PAIN: ICD-10-CM

## 2024-05-22 PROCEDURE — 97140 MANUAL THERAPY 1/> REGIONS: CPT | Mod: GP

## 2024-05-22 PROCEDURE — 97110 THERAPEUTIC EXERCISES: CPT | Mod: GP

## 2024-05-22 NOTE — PROGRESS NOTES
"Physical Therapy    Physical Therapy Treatment    Patient Name: Mac Bello  MRN: [id number]  Today's Date: 5/22/2024  Time Calculation  Start Time: 0850  Stop Time: 0930  Time Calculation (min): 40 min  Visit: 7     PT Therapeutic Procedures Time Entry  Manual Therapy Time Entry: 15  Therapeutic Exercise Time Entry: 25          Assessment:   Progressed with mobility and strengthening to patients tolerance. Continuation of manual therapy due to favorable response; recommend daily self manual between sessions. Reduced pain to 2/10 at end of treatment.    Plan:   Plan for goal review next.    Primary Dx: pain of L heel    Current Problem  1. Pain of left heel  Follow Up In Physical Therapy      2. Left foot pain  Follow Up In Physical Therapy          General   **referred for L heel pain, L foot      Subjective    Feeling better since first coming to therapy. Still feeling some pain in the heel. Wants to walk with wife around 3 miles). Sitting and driving notices initial pain, so trying to complete exercises while sitting before getting up.    Pain     2-3/10 current pain closer to the L heel  5-6/10 at worst after wearing flip flops    Objective     Treatments:  Therapeutic Exercise:  Step calf stretch x 25 reps ea  Three way calf raise 3 x 30 each  Supported lunge focusing on hallux extension x 10 reps ea  Tandem on airex balance  Not today:  SLS with multi direction ball toss 3 x 30 sec bouts  Soleus raises x 20  Standing arch raises with toe curl x 20  8\" box step up single leg x 20  Balance platform DF/PF x 20    Manual:   IASTM to left plantar fascia and heel  Cross friction massage, MFR, DTM L plantar foot  Hallux extension stretch    OP EDUCATION:   Verbal, demonstration    Goals:  Active       PT Problem       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, caregiver duties without adverse reaction on average       Start:  03/29/24    Expected End:  06/01/24            Patient will improve L ankle DF by 5 deg to reduce " tightness and increase mobility and gait       Start:  03/29/24    Expected End:  06/01/24            Patient will stand, walk with minimal to no pain on average, 4/10 at worst       Start:  03/29/24    Expected End:  06/01/24            Patient will improve L ankle strength to 5/5 throughout; improve single leg heel raises by 5-10 reps with UE support       Start:  03/29/24    Expected End:  06/01/24            Patient will demonstrate independence and compliance with HEP and self management       Start:  03/29/24    Expected End:  06/01/24            Patient will improve LEFS score by 9 points to meet minimal detectable change of improvement to demonstrate increased functional mobility       Start:  03/29/24    Expected End:  06/01/24

## 2024-06-11 ENCOUNTER — DOCUMENTATION (OUTPATIENT)
Dept: PHYSICAL THERAPY | Facility: CLINIC | Age: 44
End: 2024-06-11
Payer: COMMERCIAL

## 2024-06-11 NOTE — PROGRESS NOTES
Physical Therapy                 Therapy Communication Note    Patient Name: Mac Bello  MRN: [id number]  Today's Date: 6/11/2024     Discipline: Physical Therapy    Missed Visit Reason:  Forgot    Missed Time: No Show    Comment: rescheduled to 6/12/2024

## 2024-06-12 ENCOUNTER — TREATMENT (OUTPATIENT)
Dept: PHYSICAL THERAPY | Facility: CLINIC | Age: 44
End: 2024-06-12
Payer: COMMERCIAL

## 2024-06-12 DIAGNOSIS — M79.672 PAIN OF LEFT HEEL: ICD-10-CM

## 2024-06-12 DIAGNOSIS — M79.672 LEFT FOOT PAIN: ICD-10-CM

## 2024-06-12 PROCEDURE — 97140 MANUAL THERAPY 1/> REGIONS: CPT | Mod: GP

## 2024-06-12 PROCEDURE — 97110 THERAPEUTIC EXERCISES: CPT | Mod: GP

## 2024-06-12 NOTE — PROGRESS NOTES
Physical Therapy    Physical Therapy Treatment & Discharge    Patient Name: Mac Bello  MRN: [id number]  Today's Date: 6/12/2024  Time Calculation  Start Time: 0145  Stop Time: 0230  Time Calculation (min): 45 min     PT Therapeutic Procedures Time Entry  Manual Therapy Time Entry: 15  Therapeutic Exercise Time Entry: 30      Visit: 8    Assessment:   Patient demonstrates improvement in L ankle AROM, L ankle force production, as well as improvement in reported functional mobility. Despite PT interventions, pain with initial steps in the morning or after sitting persist, although lessened in intensity. With consistency in HEP, he is able to reduce/manage pain levels. Due to continued pain, recommend return for follow up with referring provider, as well as continue with HEP. Also discussed benefits of dry needling with patient and he plans to pursue for further management of symptoms.    Plan:   Discharge from PT POC. Plateau of progress.  Despite PT interventions, pain with initial steps in the morning or after sitting persist, although lessened in intensity. With consistency in HEP, he is able to reduce/manage pain levels. Due to continued pain, recommend return for follow up with referring provider, as well as continue with HEP. Also discussed benefits of dry needling with patient and he plans to pursue for further management of symptoms.      Primary Dx: pain of L heel    Current Problem  1. Pain of left heel  Follow Up In Physical Therapy      2. Left foot pain  Follow Up In Physical Therapy          General   **referred for L heel pain, L foot      Subjective    Wearing shoes and inserts.  Consistent HEP. The more I do the better I feel.  Able to walk whole distance with wife, 1 hour 2x/week.  3-7/10 pain ranges with standing    Pain   2-3/10 current L heel pain  5-6/10 at worst first couple steps or after long work day; stretch it out and it goes down  1/10 at best    Objective   Gait: Independent ambulation,  "non-antalgic      Ankle & Foot AROM:  Dorsiflexion: L 8 deg  Plantarflexion: L 40 deg  Inversion: L 38 deg  Eversion: L 10 deg     Knee Strength: 5/5 L knee     L Ankle & Foot Strength:  Dorsiflexion: 5/5   Plantarflexion: SL heel raise 15 L  Inversion: 5/5   Eversion: 5/5    Outcome Measures:  Lower Extremity Functional Scale (LEFS): 45    Treatments:  Therapeutic Exercise:  Reassess  Reviewed/Performed:  Scifit bike x 7 mins ROM/warm-up/strength  Step stretch x 10 reps ea  Heel raise SL  Supported lunge focusing on hallux extension x 15 reps ea  Tandem on airex balance  L SLS with ball toss x 30 seconds  Soleus raises x 20  Standing arch raises with toe curl x 20  8\" box step up single leg x 20  Balance platform DF/PF x 20    Manual:   Cross friction massage, MFR, DTM L plantar foot  Hallux extension stretch    OP EDUCATION:   Continue with HEP Independently     Goals:  Active       PT Problem       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, caregiver duties without adverse reaction on average (Progressing)       Start:  03/29/24    Expected End:  06/01/24            Patient will improve L ankle DF by 5 deg to reduce tightness and increase mobility and gait (Met)       Start:  03/29/24    Expected End:  06/01/24    Resolved:  06/12/24         Patient will stand, walk with minimal to no pain on average, 4/10 at worst (Progressing)       Start:  03/29/24    Expected End:  06/01/24            Patient will improve L ankle strength to 5/5 throughout; improve single leg heel raises by 5-10 reps with UE support (Met)       Start:  03/29/24    Expected End:  06/01/24    Resolved:  06/12/24         Patient will demonstrate independence and compliance with HEP and self management (Met)       Start:  03/29/24    Expected End:  06/01/24    Resolved:  06/12/24         Patient will improve LEFS score by 9 points to meet minimal detectable change of improvement to demonstrate increased functional mobility (Progressing)       Start:  " 03/29/24    Expected End:  06/01/24

## 2024-07-10 ENCOUNTER — DOCUMENTATION (OUTPATIENT)
Dept: PHYSICAL THERAPY | Facility: CLINIC | Age: 44
End: 2024-07-10
Payer: COMMERCIAL

## 2024-07-10 ENCOUNTER — APPOINTMENT (OUTPATIENT)
Dept: PHYSICAL THERAPY | Facility: CLINIC | Age: 44
End: 2024-07-10
Payer: COMMERCIAL

## 2024-07-10 NOTE — PROGRESS NOTES
Physical Therapy                 Therapy Communication Note    Patient Name: Mac Bello  MRN: [id number]  Today's Date: 7/10/2024     Discipline: Physical Therapy    Missed Visit Reason:      Missed Time: Cancel    Comment: Patient called to cancel appointment. No reason reported by PSR.

## 2024-09-04 ENCOUNTER — OFFICE VISIT (OUTPATIENT)
Dept: PRIMARY CARE | Facility: CLINIC | Age: 44
End: 2024-09-04
Payer: COMMERCIAL

## 2024-09-04 VITALS
OXYGEN SATURATION: 99 % | WEIGHT: 199.2 LBS | HEART RATE: 66 BPM | SYSTOLIC BLOOD PRESSURE: 132 MMHG | DIASTOLIC BLOOD PRESSURE: 88 MMHG | RESPIRATION RATE: 17 BRPM | BODY MASS INDEX: 27.78 KG/M2

## 2024-09-04 DIAGNOSIS — E78.2 MIXED HYPERLIPIDEMIA: ICD-10-CM

## 2024-09-04 DIAGNOSIS — E55.9 VITAMIN D DEFICIENCY: ICD-10-CM

## 2024-09-04 DIAGNOSIS — M25.512 CHRONIC LEFT SHOULDER PAIN: ICD-10-CM

## 2024-09-04 DIAGNOSIS — G89.29 CHRONIC LEFT SHOULDER PAIN: ICD-10-CM

## 2024-09-04 DIAGNOSIS — E03.9 HYPOTHYROIDISM, UNSPECIFIED TYPE: Primary | ICD-10-CM

## 2024-09-04 LAB
25(OH)D3 SERPL-MCNC: 19 NG/ML (ref 30–100)
T4 FREE SERPL-MCNC: 0.23 NG/DL (ref 0.78–1.48)

## 2024-09-04 PROCEDURE — 80053 COMPREHEN METABOLIC PANEL: CPT

## 2024-09-04 PROCEDURE — 80061 LIPID PANEL: CPT

## 2024-09-04 PROCEDURE — 84439 ASSAY OF FREE THYROXINE: CPT

## 2024-09-04 PROCEDURE — 84443 ASSAY THYROID STIM HORMONE: CPT

## 2024-09-04 PROCEDURE — 82306 VITAMIN D 25 HYDROXY: CPT

## 2024-09-04 PROCEDURE — 99214 OFFICE O/P EST MOD 30 MIN: CPT | Performed by: NURSE PRACTITIONER

## 2024-09-04 NOTE — PROGRESS NOTES
Subjective   Patient ID: Mac Bello is a 43 y.o. male who presents for Follow-up (TSH and BP. Out of meds).    Mac presents to the office today for a follow-up on hypothyroidism. He stated he has been taking Levothyroxine daily.    Has been having left shoulder pain; pain is getting worse. Has gone through physical therapy without any improvement in left shoulder pain. Unable to do any heavy lifting with left upper extremity. Decreased range of motion left upper extremity. Had TDN of left shoulder with PT.          Review of Systems   Musculoskeletal:         Left shoulder pain and decreased range of motion.   All other systems reviewed and are negative.      Objective   /88   Pulse 66   Resp 17   Wt 90.4 kg (199 lb 3.2 oz)   SpO2 99%   BMI 27.78 kg/m²     Physical Exam  Constitutional:       Appearance: Normal appearance.   HENT:      Head: Normocephalic and atraumatic.      Right Ear: Tympanic membrane, ear canal and external ear normal.      Left Ear: Tympanic membrane, ear canal and external ear normal.      Nose: Nose normal.      Mouth/Throat:      Mouth: Mucous membranes are moist.      Pharynx: Oropharynx is clear.   Eyes:      Extraocular Movements: Extraocular movements intact.      Conjunctiva/sclera: Conjunctivae normal.      Pupils: Pupils are equal, round, and reactive to light.   Cardiovascular:      Rate and Rhythm: Normal rate and regular rhythm.   Pulmonary:      Effort: Pulmonary effort is normal.      Breath sounds: Normal breath sounds.   Abdominal:      General: Abdomen is flat. Bowel sounds are normal.      Palpations: Abdomen is soft.   Musculoskeletal:         General: Normal range of motion.      Cervical back: Normal range of motion.      Comments: Decreased ROM left shoulder with abduction.. No significant point tenderness. Abduction limited. Unable to lift shoulder above head. Abduction approx 90 degrees.   Skin:     General: Skin is warm and dry.   Neurological:       General: No focal deficit present.      Mental Status: He is alert and oriented to person, place, and time. Mental status is at baseline.   Psychiatric:         Mood and Affect: Mood normal.         Behavior: Behavior normal.         Thought Content: Thought content normal.         Judgment: Judgment normal.       Assessment/Plan   Problem List Items Addressed This Visit             ICD-10-CM    Hyperlipidemia E78.5    Relevant Orders    Lipid Panel (Completed)    Comprehensive Metabolic Panel (Completed)    Hypothyroidism - Primary E03.9    Relevant Orders    Thyroid Stimulating Hormone (Completed)    Thyroxine, Free (Completed)    Referral to Endocrinology    Vitamin D deficiency E55.9    Relevant Orders    Vitamin D 25-Hydroxy,Total (for eval of Vitamin D levels) (Completed)     Other Visit Diagnoses         Codes    Chronic left shoulder pain     M25.512, G89.29    Relevant Orders    MR arthrogram shoulder left    Referral to Orthopaedic Surgery          1) Hypothyroidism: thyroid blood work ordered. Continue Levothyroxine.    2) Left shoulder pain: MRI ordered of left shoulder; r/o rotator cuff tear.    3) Hyperlipidemia: lipids ordered. Watch foods high in cholesterol (fried foods, fast food, processed meats, desserts such as cookies, cake, ice cream, pastries and other sweets). Some foods that are high in cholesterol are healthy additions to your diet (eggs-4/week, cheese, shellfish, pasture raised steak, organ meats such as heart, kidney and liver, sardines and full-fat yogurt). Continue Rosuvastatin.

## 2024-09-05 DIAGNOSIS — E78.00 ELEVATED CHOLESTEROL: ICD-10-CM

## 2024-09-05 LAB
ALBUMIN SERPL BCP-MCNC: 4.8 G/DL (ref 3.4–5)
ALP SERPL-CCNC: 44 U/L (ref 33–120)
ALT SERPL W P-5'-P-CCNC: 22 U/L (ref 10–52)
ANION GAP SERPL CALC-SCNC: 14 MMOL/L (ref 10–20)
AST SERPL W P-5'-P-CCNC: 33 U/L (ref 9–39)
BILIRUB SERPL-MCNC: 0.4 MG/DL (ref 0–1.2)
BUN SERPL-MCNC: 21 MG/DL (ref 6–23)
CALCIUM SERPL-MCNC: 9.6 MG/DL (ref 8.6–10.6)
CHLORIDE SERPL-SCNC: 106 MMOL/L (ref 98–107)
CHOLEST SERPL-MCNC: 325 MG/DL (ref 0–199)
CHOLESTEROL/HDL RATIO: 3.2
CO2 SERPL-SCNC: 29 MMOL/L (ref 21–32)
CREAT SERPL-MCNC: 1.07 MG/DL (ref 0.5–1.3)
EGFRCR SERPLBLD CKD-EPI 2021: 88 ML/MIN/1.73M*2
GLUCOSE SERPL-MCNC: 83 MG/DL (ref 74–99)
HDLC SERPL-MCNC: 100.5 MG/DL
LDLC SERPL CALC-MCNC: 211 MG/DL
NON HDL CHOLESTEROL: 225 MG/DL (ref 0–149)
POTASSIUM SERPL-SCNC: 3.8 MMOL/L (ref 3.5–5.3)
PROT SERPL-MCNC: 7.3 G/DL (ref 6.4–8.2)
SODIUM SERPL-SCNC: 145 MMOL/L (ref 136–145)
TRIGL SERPL-MCNC: 69 MG/DL (ref 0–149)
TSH SERPL-ACNC: >120 MIU/L (ref 0.44–3.98)
VLDL: 14 MG/DL (ref 0–40)

## 2024-09-05 RX ORDER — ROSUVASTATIN CALCIUM 20 MG/1
20 TABLET, COATED ORAL DAILY
Qty: 90 TABLET | Refills: 0 | Status: SHIPPED | OUTPATIENT
Start: 2024-09-05

## 2024-09-05 RX ORDER — LEVOTHYROXINE SODIUM 125 UG/1
125 TABLET ORAL DAILY
Qty: 90 TABLET | Refills: 0 | Status: SHIPPED | OUTPATIENT
Start: 2024-09-05

## 2024-09-05 RX ORDER — LEVOTHYROXINE SODIUM 125 UG/1
125 TABLET ORAL DAILY
Qty: 90 TABLET | Refills: 0 | Status: SHIPPED | OUTPATIENT
Start: 2024-09-05 | End: 2024-09-05

## 2024-09-10 ENCOUNTER — TREATMENT (OUTPATIENT)
Dept: PHYSICAL THERAPY | Facility: CLINIC | Age: 44
End: 2024-09-10
Payer: COMMERCIAL

## 2024-09-10 DIAGNOSIS — M25.512 ACUTE PAIN OF LEFT SHOULDER: Primary | ICD-10-CM

## 2024-09-10 DIAGNOSIS — M67.912 ROTATOR CUFF DYSFUNCTION, LEFT: ICD-10-CM

## 2024-09-10 PROCEDURE — 4200000004 HC PT PHASE II 15 MIN CHG: Mod: GP | Performed by: PHYSICAL THERAPIST

## 2024-09-10 NOTE — PROGRESS NOTES
Physical Therapy    Physical Therapy Treatment    Patient Name: Mac Bello  MRN: [id number]  Today's Date: 9/10/2024  Time Calculation  Start Time: 1125  Stop Time: 1155  Time Calculation (min): 30 min        Cash Pay Phase II dry needling  Dry needling visit: 5    Assessment:  No adverse reaction to TDN today. Considerable twitch response was noted at all muscles treated with TDN.    His left shoulder pain has progressively worsened over the last few months. He has a referral from his PCP for shoulder MRI and orthopedic consult. He will schedule both this week. Patient may continue dry needling as indicated if he feels that it is beneficial as it has been in the past.   Plan:  Continue TDN as indicated, and as requested by patient.     Current Problem  1. Acute pain of left shoulder        2. Rotator cuff dysfunction, left                General  Patient wishes to undergo dry needling to address his left shoulder pain. He completed the dry needling agreement form and questionnaire. Patient verbally consents to dry needling.     Subjective    Precautions  none  General Subjective  Limited in elevating, reaching in all directions, lifting light object (I.e. cup), and sleeping due to left shoulder pain.   Pain  Left shoulder pain is 5-610 today.  Left shoulder pain is progressively worsening which is why he is seeking TDN again today.     Objective   Treatments:  Performed dry needling (TDN) to left proximal bicep, anterior, posterior and middle deltoid,, teres minor and infraspinatus, and posterior RTC interval, upper trap    OP EDUCATION:      Goals:

## 2024-09-25 ENCOUNTER — HOSPITAL ENCOUNTER (OUTPATIENT)
Dept: RADIOLOGY | Facility: HOSPITAL | Age: 44
Discharge: HOME | End: 2024-09-25
Payer: COMMERCIAL

## 2024-09-25 DIAGNOSIS — G89.29 CHRONIC LEFT SHOULDER PAIN: ICD-10-CM

## 2024-09-25 DIAGNOSIS — M25.512 CHRONIC LEFT SHOULDER PAIN: Primary | ICD-10-CM

## 2024-09-25 DIAGNOSIS — M25.512 CHRONIC LEFT SHOULDER PAIN: ICD-10-CM

## 2024-09-25 DIAGNOSIS — G89.29 CHRONIC LEFT SHOULDER PAIN: Primary | ICD-10-CM

## 2024-09-26 ENCOUNTER — HOSPITAL ENCOUNTER (OUTPATIENT)
Dept: RADIOLOGY | Facility: HOSPITAL | Age: 44
Discharge: HOME | End: 2024-09-26
Payer: COMMERCIAL

## 2024-09-26 PROCEDURE — 73222 MRI JOINT UPR EXTREM W/DYE: CPT | Mod: LEFT SIDE | Performed by: RADIOLOGY

## 2024-09-26 PROCEDURE — 2550000001 HC RX 255 CONTRASTS: Performed by: NURSE PRACTITIONER

## 2024-09-26 PROCEDURE — 73040 CONTRAST X-RAY OF SHOULDER: CPT | Mod: LT

## 2024-09-26 PROCEDURE — 2500000005 HC RX 250 GENERAL PHARMACY W/O HCPCS

## 2024-09-26 PROCEDURE — A9577 INJ MULTIHANCE: HCPCS | Performed by: NURSE PRACTITIONER

## 2024-09-26 PROCEDURE — 73222 MRI JOINT UPR EXTREM W/DYE: CPT | Mod: LT

## 2024-09-26 RX ORDER — LIDOCAINE HYDROCHLORIDE 10 MG/ML
INJECTION, SOLUTION INFILTRATION; PERINEURAL
Status: COMPLETED
Start: 2024-09-26 | End: 2024-09-26

## 2024-09-26 RX ORDER — LIDOCAINE HYDROCHLORIDE 10 MG/ML
10 INJECTION, SOLUTION EPIDURAL; INFILTRATION; INTRACAUDAL; PERINEURAL ONCE
Status: DISCONTINUED | OUTPATIENT
Start: 2024-09-26 | End: 2024-09-27 | Stop reason: HOSPADM

## 2024-09-26 NOTE — POST-PROCEDURE NOTE
Interventional Radiology Brief Postprocedure Note    Attending: Tereza Guthrie CNP    Assistant: none    Diagnosis: Shoulder pain.     Description of procedure: left intraarticular shoulder injection under direct fluoroscopic guidance.   10 mL lidocaine subQ   10 mL of a solution of 1% lidocaine, Omnipaque, and gadolinium intraarticular      Anesthesia:  Local    Complications: None    Estimated Blood Loss: minimal    Specimens: No    See detailed result report with images in PACS.    The patient tolerated the procedure well without incident or complication and is in stable condition.

## 2024-09-26 NOTE — PRE-PROCEDURE NOTE
Interventional Radiology Preprocedure Note    Indication for procedure: Decreased shoulder ROM and pain    Relevant review of systems: Left shoulder pain.     Relevant Labs:   Lab Results   Component Value Date    CREATININE 1.07 09/04/2024    EGFR 88 09/04/2024       Planned Sedation/Anesthesia: local    Airway assessment: normal    Directed physical examination:    A&Ox3  Breathing Unlabored  Ambulates without assistive devices    Plan for fl guided intraarticular injection left shoulder for MR arthrogram.     Benefits, risks and alternatives of procedure and planned sedation have been discussed with the patient and/or their representative. All questions answered and they agree to proceed.

## 2024-10-02 ENCOUNTER — PREP FOR PROCEDURE (OUTPATIENT)
Dept: ORTHOPEDICS | Facility: HOSPITAL | Age: 44
End: 2024-10-02

## 2024-10-02 ENCOUNTER — OFFICE VISIT (OUTPATIENT)
Dept: ORTHOPEDIC SURGERY | Facility: CLINIC | Age: 44
End: 2024-10-02
Payer: COMMERCIAL

## 2024-10-02 DIAGNOSIS — S43.432A SUPERIOR LABRUM ANTERIOR-TO-POSTERIOR (SLAP) TEAR OF LEFT SHOULDER: Primary | ICD-10-CM

## 2024-10-02 DIAGNOSIS — G89.29 CHRONIC LEFT SHOULDER PAIN: ICD-10-CM

## 2024-10-02 DIAGNOSIS — M25.512 CHRONIC LEFT SHOULDER PAIN: ICD-10-CM

## 2024-10-02 PROCEDURE — 99214 OFFICE O/P EST MOD 30 MIN: CPT | Performed by: STUDENT IN AN ORGANIZED HEALTH CARE EDUCATION/TRAINING PROGRAM

## 2024-10-02 PROCEDURE — 4004F PT TOBACCO SCREEN RCVD TLK: CPT | Performed by: STUDENT IN AN ORGANIZED HEALTH CARE EDUCATION/TRAINING PROGRAM

## 2024-10-02 RX ORDER — LIDOCAINE 50 MG/G
1 PATCH TOPICAL DAILY
Qty: 30 PATCH | Refills: 2 | Status: SHIPPED | OUTPATIENT
Start: 2024-10-02 | End: 2024-12-01

## 2024-10-02 RX ORDER — SODIUM CHLORIDE, SODIUM LACTATE, POTASSIUM CHLORIDE, CALCIUM CHLORIDE 600; 310; 30; 20 MG/100ML; MG/100ML; MG/100ML; MG/100ML
20 INJECTION, SOLUTION INTRAVENOUS CONTINUOUS
OUTPATIENT
Start: 2024-10-02

## 2024-10-02 ASSESSMENT — PAIN SCALES - GENERAL: PAINLEVEL_OUTOF10: 5 - MODERATE PAIN

## 2024-10-02 ASSESSMENT — PAIN - FUNCTIONAL ASSESSMENT: PAIN_FUNCTIONAL_ASSESSMENT: 0-10

## 2024-10-02 ASSESSMENT — PAIN DESCRIPTION - DESCRIPTORS: DESCRIPTORS: ACHING

## 2024-10-02 NOTE — PROGRESS NOTES
CHIEF COMPLAINT:   Chief Complaint   Patient presents with    Left Shoulder - New Patient Visit     History: 43 y.o. male presents to the office today for evaluation of his left shoulder.  The patient is right-hand dominant.  He works at [employer].  He is otherwise healthy.  He states he been having pain in the left shoulder for almost the last year.  No specific injury he can remember.  The pain is quite severe and keeps him up at night.  Difficulty sleeping.  He has been to extensive physical therapy for more than 6 weeks on the left side without significant improvements.  He had an MRI ordered by another provider and is here today for surgical discussion.  Pain is primarily deep and posterior in the left shoulder.    Past medical history, past surgical history, medications, allergies, family history, social history, and review of systems were reviewed today.    A 12 point review of systems was negative other than as stated in the HPI.    Past Medical History:   Diagnosis Date    Flat foot (pes planus) (acquired), unspecified foot 03/10/2015    Pes planus    Hyperlipidemia, unspecified 09/21/2022    Hyperlipidemia    Hypothyroidism, unspecified 12/30/2022    Hypothyroidism    Low back pain, unspecified 03/09/2022    Low back pain        No Known Allergies     Past Surgical History:   Procedure Laterality Date    OTHER SURGICAL HISTORY  11/29/2016    Laparoscopy Repair Of Initial Inguinal Hernia    OTHER SURGICAL HISTORY  03/25/2020    Surgery        Family History   Problem Relation Name Age of Onset    Other (cardiac disorder) Mother      Other (CVA) Mother      Hypertension Mother      Cancer Mother      Hypertension Father      Other (cardiac disorder) Other grandmother     Hypertension Other grandmother     Cancer Other grandmother     Other (cardiac disorder) Other grandfather         Social History     Socioeconomic History    Marital status: Single     Spouse name: Not on file    Number of children: Not on  "file    Years of education: Not on file    Highest education level: Not on file   Occupational History    Not on file   Tobacco Use    Smoking status: Every Day     Types: Cigars     Passive exposure: Current    Smokeless tobacco: Not on file   Substance and Sexual Activity    Alcohol use: Yes     Comment: social drinker    Drug use: Yes     Types: Marijuana    Sexual activity: Not on file   Other Topics Concern    Not on file   Social History Narrative    Not on file     Social Determinants of Health     Financial Resource Strain: Not on file   Food Insecurity: Not on file   Transportation Needs: Not on file   Physical Activity: Not on file   Stress: Not on file   Social Connections: Not on file   Intimate Partner Violence: Not on file   Housing Stability: Not on file        CURRENT MEDICATIONS:   Current Outpatient Medications   Medication Sig Dispense Refill    levothyroxine (Synthroid, Levoxyl) 125 mcg tablet Take 1 tablet (125 mcg) by mouth early in the morning.. Take on an empty stomach at the same time each day, either 30 to 60 minutes prior to breakfast 90 tablet 0    rosuvastatin (Crestor) 20 mg tablet Take 1 tablet (20 mg) by mouth once daily. 90 tablet 0     No current facility-administered medications for this visit.       Physical Examination:      3/16/2023     8:56 AM 3/22/2023     9:27 AM 3/22/2023     9:43 AM 4/26/2023     1:05 PM 8/9/2023     1:14 PM 12/27/2023     1:04 PM 9/4/2024     9:50 AM   Vitals   Systolic 142 143 120 132 119 137 132   Diastolic 100 93 90 84 77 81 88   Heart Rate  76  81 55 62 66   Temp 37 °C (98.6 °F)         Resp       17   Height (in)  1.803 m (5' 11\")  1.803 m (5' 11\") 1.803 m (5' 11\") 1.803 m (5' 11\")    Weight (lb)  218  205 196 201 199.2   BMI  30.4 kg/m2  28.59 kg/m2 27.34 kg/m2 28.03 kg/m2 27.78 kg/m2   BSA (m2)  2.23 m2  2.16 m2 2.11 m2 2.14 m2 2.13 m2   Visit Report  Report Report Report Report Report Report      There is no height or weight on file to calculate " BMI.    Well-appearing, appears stated age, pleasant and cooperative, appropriate mood and behavior. Height and weight reviewed. Alert and oriented x3.  Auditory function intact.  No acute distress.  Intact ocular function, JESSICA, EOMI. Breathing is unlabored .  There is no evidence of jugular venous distension. Skin appearance is normal without evidence of rash or other lesions. 2+ radial pulses bilaterally, fingers pink and wwp, good capillary refill, no pitting edema. No appreciable lymphadenopathy in bilateral upper extremities. SILT throughout both upper extremities, median/radial/ulnar/musculocutaneous/axillary nerve motor and sensory intact (except for abnormalities noted in focused musculoskeletal exam section below).     Neck exam: Full range of motion of the neck in flexion/extension and rotational movements. No significant areas of tenderness to palpation in the neck.    On exam of bilateral upper extremities, no significant areas of tenderness palpation.  On the right side is active forward flexion 180, external rotation to 70, internal Tatian to T12.  On the left he has active forward flexion 150, external Tatian to 50, internal rotation to T12.  Passively I can achieve full range of motion of the left but it is painful for the patient.  He has pain with Neer and Valdes maneuvers of the left shoulder, but the most pain he has is with biceps provocative testing and labral testing on the left side.    Imaging: MRI of the left shoulder was reviewed today.  Personally interpreted by myself.  The rotator cuff is intact.  There is tearing of the posterior superior labrum.  There is some fluid around the biceps tendon.    Assessment: Superior labral tear    Plan: Long discussion with the patient with treatment options going forward.  The patient's examination findings do match his MRI and that the majority of his pain is coming from labral and biceps provocative testing.  He has failed more than 6 weeks of  physical therapy and is dealing with the symptoms for the last year.  He has also failed activity modifications and over-the-counter medications.  This point I do think it is reasonable to consider surgery.  This would be in the form of a superior labral repair, possible biceps tenodesis.  Patient agrees with this and would like to proceed.    The patient has failed conservative management.  At this point, the patient is a candidate for surgery.  Patient is in agreement with this.  Risks and recovery after surgery were discussed.  The proposed procedure will be an arthroscopic superior labral repair, possible biceps tenodesis.  Risks of surgery include bleeding,  infection, neurovascular injury, persistent pain, recurrent shoulder instability, failure of the repair, and possible need for further surgery. All surgeries that are performed under anesthesia also have the risks of DVTs, pulmonary embolism, potentially death. Per anesthesia's evaluation, the patient will have a nerve block, the risks of which the anesthesia team will discuss with the patient.   Patient voiced understanding of these risks and wished to proceed.      The patient will need PATs which will also include a CBC, BMP, PT/INR and a full work up by the PAT team as well as anesthesia evaluation.  My office will work to get this scheduled. I will see them back 2 weeks after surgery.      Dragon software was used to dictate this note, please be aware that minor errors in transcription may be present.    Jamie Lucero MD    Shoulder/Elbow Surgery  [facility]

## 2024-10-09 ENCOUNTER — CLINICAL SUPPORT (OUTPATIENT)
Dept: PREADMISSION TESTING | Facility: HOSPITAL | Age: 44
End: 2024-10-09
Payer: COMMERCIAL

## 2024-10-09 DIAGNOSIS — S43.432A SUPERIOR LABRUM ANTERIOR-TO-POSTERIOR (SLAP) TEAR OF LEFT SHOULDER: ICD-10-CM

## 2024-10-09 RX ORDER — IBUPROFEN 200 MG
200 TABLET ORAL EVERY 8 HOURS PRN
COMMUNITY
End: 2024-10-21 | Stop reason: HOSPADM

## 2024-10-16 ENCOUNTER — LAB (OUTPATIENT)
Dept: LAB | Facility: LAB | Age: 44
End: 2024-10-16
Payer: COMMERCIAL

## 2024-10-16 ENCOUNTER — DOCUMENTATION (OUTPATIENT)
Dept: PREADMISSION TESTING | Facility: HOSPITAL | Age: 44
End: 2024-10-16

## 2024-10-16 ENCOUNTER — PRE-ADMISSION TESTING (OUTPATIENT)
Dept: PREADMISSION TESTING | Facility: HOSPITAL | Age: 44
End: 2024-10-16
Payer: COMMERCIAL

## 2024-10-16 VITALS
OXYGEN SATURATION: 98 % | HEIGHT: 72 IN | SYSTOLIC BLOOD PRESSURE: 155 MMHG | BODY MASS INDEX: 26.87 KG/M2 | TEMPERATURE: 96.8 F | RESPIRATION RATE: 16 BRPM | WEIGHT: 198.41 LBS | DIASTOLIC BLOOD PRESSURE: 94 MMHG

## 2024-10-16 DIAGNOSIS — Z01.818 PRE-OP TESTING: ICD-10-CM

## 2024-10-16 DIAGNOSIS — Z01.818 PRE-OP TESTING: Primary | ICD-10-CM

## 2024-10-16 LAB
ATRIAL RATE: 65 BPM
BASOPHILS # BLD AUTO: 0.04 X10*3/UL (ref 0–0.1)
BASOPHILS NFR BLD AUTO: 0.7 %
EOSINOPHIL # BLD AUTO: 0.16 X10*3/UL (ref 0–0.7)
EOSINOPHIL NFR BLD AUTO: 2.7 %
ERYTHROCYTE [DISTWIDTH] IN BLOOD BY AUTOMATED COUNT: 14.6 % (ref 11.5–14.5)
HCT VFR BLD AUTO: 42 % (ref 41–52)
HGB BLD-MCNC: 14 G/DL (ref 13.5–17.5)
IMM GRANULOCYTES # BLD AUTO: 0.02 X10*3/UL (ref 0–0.7)
IMM GRANULOCYTES NFR BLD AUTO: 0.3 % (ref 0–0.9)
LYMPHOCYTES # BLD AUTO: 2 X10*3/UL (ref 1.2–4.8)
LYMPHOCYTES NFR BLD AUTO: 33.7 %
MCH RBC QN AUTO: 31.6 PG (ref 26–34)
MCHC RBC AUTO-ENTMCNC: 33.3 G/DL (ref 32–36)
MCV RBC AUTO: 95 FL (ref 80–100)
MONOCYTES # BLD AUTO: 0.47 X10*3/UL (ref 0.1–1)
MONOCYTES NFR BLD AUTO: 7.9 %
NEUTROPHILS # BLD AUTO: 3.25 X10*3/UL (ref 1.2–7.7)
NEUTROPHILS NFR BLD AUTO: 54.7 %
NRBC BLD-RTO: 0 /100 WBCS (ref 0–0)
P AXIS: 66 DEGREES
P OFFSET: 179 MS
P ONSET: 122 MS
PLATELET # BLD AUTO: 157 X10*3/UL (ref 150–450)
PR INTERVAL: 198 MS
Q ONSET: 221 MS
QRS COUNT: 11 BEATS
QRS DURATION: 88 MS
QT INTERVAL: 428 MS
QTC CALCULATION(BAZETT): 445 MS
QTC FREDERICIA: 439 MS
R AXIS: 66 DEGREES
RBC # BLD AUTO: 4.43 X10*6/UL (ref 4.5–5.9)
T AXIS: 45 DEGREES
T OFFSET: 435 MS
T4 FREE SERPL-MCNC: 0.62 NG/DL (ref 0.61–1.12)
TSH SERPL-ACNC: 72 MIU/L (ref 0.44–3.98)
VENTRICULAR RATE: 65 BPM
WBC # BLD AUTO: 5.9 X10*3/UL (ref 4.4–11.3)

## 2024-10-16 PROCEDURE — 93010 ELECTROCARDIOGRAM REPORT: CPT | Performed by: INTERNAL MEDICINE

## 2024-10-16 PROCEDURE — 84443 ASSAY THYROID STIM HORMONE: CPT

## 2024-10-16 PROCEDURE — 36415 COLL VENOUS BLD VENIPUNCTURE: CPT

## 2024-10-16 PROCEDURE — 84439 ASSAY OF FREE THYROXINE: CPT

## 2024-10-16 PROCEDURE — 85025 COMPLETE CBC W/AUTO DIFF WBC: CPT

## 2024-10-16 PROCEDURE — 93005 ELECTROCARDIOGRAM TRACING: CPT | Performed by: NURSE PRACTITIONER

## 2024-10-16 ASSESSMENT — ENCOUNTER SYMPTOMS
CONSTITUTIONAL NEGATIVE: 1
BRUISES/BLEEDS EASILY: 0
CONSTIPATION: 0
ABDOMINAL PAIN: 0
VOMITING: 0
NEUROLOGICAL NEGATIVE: 1
DIARRHEA: 0
CARDIOVASCULAR NEGATIVE: 1
MYALGIAS: 1
GASTROINTESTINAL NEGATIVE: 1
LIMITED RANGE OF MOTION: 1
NAUSEA: 0
NECK NEGATIVE: 1
DYSPNEA WITH EXERTION: 0
ARTHRALGIAS: 1
PALPITATIONS: 0
DYSPNEA AT REST: 0

## 2024-10-16 NOTE — PREPROCEDURE INSTRUCTIONS
Medication List            Accurate as of October 16, 2024  9:08 AM. Always use your most recent med list.                ibuprofen 200 mg tablet  Additional Medication Adjustments for Surgery: Take last dose 7 days before surgery     levothyroxine 125 mcg tablet  Commonly known as: Synthroid, Levoxyl  Take 1 tablet (125 mcg) by mouth early in the morning.. Take on an empty stomach at the same time each day, either 30 to 60 minutes prior to breakfast  Medication Adjustments for Surgery: Take on the morning of surgery     lidocaine 5 % patch  Commonly known as: Lidoderm  Place 1 patch over 12 hours on the skin once daily. Remove & discard patch within 12 hours or as directed by MD.  Medication Adjustments for Surgery: Do Not take on the morning of surgery     rosuvastatin 20 mg tablet  Commonly known as: Crestor  Take 1 tablet (20 mg) by mouth once daily.  Medication Adjustments for Surgery: Take on the morning of surgery                          **Concerning above medication instructions, if medication is normally taken at night, continue normal schedule.**  **DO NOT TAKE NIGHT PRIOR AND MORNING OF SURGERY**    CONTACT SURGEON'S OFFICE IF YOU DEVELOP:  * Fever = 100.4 F   * New respiratory symptoms (e.g. cough, shortness of breath, respiratory distress, sore throat)  * Recent loss of taste or smell  *Flu like symptoms such as headache, fatigue or gastrointestinal symptoms  * You develop any open sores, shingles, burning or painful urination   AND/OR:  * You no longer wish to have the surgery.  * Any other personal circumstances change that may lead to the need to cancel or defer this surgery.  *You were admitted to any hospital within one week of your planned procedure.    SMOKING:  *Quitting smoking can make a huge difference to your health and recovery from surgery.    *If you need help with quitting, call 4-612-QUIT-NOW.    THE DAY BEFORE SURGERY:  *Do not eat any food after midnight the night before surgery.    *You are permitted to drink clear liquids (i.e. water, black coffee (no milk or cream), tea, apple juice and electrolyte drinks (gatorade)) up to 13.5 ounces, up to 2 hours before your arrival time.  *You may chew gum until 2 hours before your surgery    SURGICAL TIME  *You will be contacted between 2 p.m. and 6 p.m. the business day before your surgery with your arrival time.  *If you haven't received a call by 6pm, call [phone].  *Scheduled surgery times may change and you will be notified if this occurs-check your personal voicemail for any updates.    ON THE MORNING OF SURGERY:  *Wear comfortable, loose fitting clothing.   *Do not use moisturizers, creams, lotions or perfume.  *All jewelry and valuables should be left at home.  *Prosthetic devices such as contact lenses, hearing aids, dentures, eyelash extensions, hairpins and body piercing must be removed before surgery.    BRING WITH YOU:  *Photo ID and insurance card  *Current list of medicines and allergies  *Pacemaker/Defibrillator/Heart stent cards  *CPAP machine and mask  *Slings/splints/crutches  *Copy of your complete Advanced Directive/DHPOA-if applicable  *Neurostimulator implant remote    PARKING AND ARRIVAL:  *Check in at the Main Entrance desk and let them know you are here for surgery.  *You will be directed to the 2nd floor surgical waiting area.    AFTER OUTPATIENT SURGERY:  *A responsible adult MUST accompany you at the time of discharge and stay with you for 24 hours after your surgery.  *You may NOT drive yourself home after surgery.  *You may use a taxi or ride sharing service ([employer], Uber) to return home ONLY if you are accompanied by a friend or family member.  *Instructions for resuming your medications will be provided by your surgeon.

## 2024-10-16 NOTE — CPM/PAT NURSE NOTE
CPM/PAT Nurse Note      Name: Mac Bello (Mac Bello)  /Age: 1980/43 y.o.       Past Medical History:   Diagnosis Date    Hyperlipidemia     Hypothyroidism due to Hashimoto's thyroiditis     Insomnia     MURIEL (obstructive sleep apnea)     patient denies    Overweight     Peripheral neuropathy     Prediabetes     23 A1C 6.0%    Right carpal tunnel syndrome     Superior labrum anterior-to-posterior (SLAP) tear of left shoulder     Plan: Left Shoulder Arthroscopic Superior Labrum Anterior Posterior Repair 10/21/24    Vitamin D deficiency        Past Surgical History:   Procedure Laterality Date    HERNIA REPAIR      Laparoscopy Repair Of Initial Inguinal Hernia       Patient Sexual activity questions deferred to the physician.    Family History   Problem Relation Name Age of Onset    Other (cardiac disorder) Mother      Other (CVA) Mother      Hypertension Mother      Cancer Mother      Hypertension Father      Other (cardiac disorder) Other grandmother     Hypertension Other grandmother     Cancer Other grandmother     Other (cardiac disorder) Other grandfather        No Known Allergies    Prior to Admission medications    Medication Sig Start Date End Date Taking? Authorizing Provider   ibuprofen 200 mg tablet Take 1 tablet (200 mg) by mouth every 8 hours if needed for mild pain (1 - 3).    Historical Provider, MD   levothyroxine (Synthroid, Levoxyl) 125 mcg tablet Take 1 tablet (125 mcg) by mouth early in the morning.. Take on an empty stomach at the same time each day, either 30 to 60 minutes prior to breakfast 24   CISCO Bettencourt   lidocaine (Lidoderm) 5 % patch Place 1 patch over 12 hours on the skin once daily. Remove & discard patch within 12 hours or as directed by MD. 10/2/24 12/1/24  Jamie Lucero MD   rosuvastatin (Crestor) 20 mg tablet Take 1 tablet (20 mg) by mouth once daily. 24   CISCO Bettencourt        PAT ROS     DASI Risk Score    No data to display        Caprini DVT Assessment    No data to display       Modified Frailty Index    No data to display       CHADS2 Stroke Risk         N/A 3 to 100%: High Risk   2 to < 3%: Medium Risk   0 to < 2%: Low Risk     Last Change: N/A          This score determines the patient's risk of having a stroke if the patient has atrial fibrillation.        This score is not applicable to this patient. Components are not calculated.          Revised Cardiac Risk Index    No data to display       Apfel Simplified Score    No data to display       Risk Analysis Index Results This Encounter    No data found in the last 10 encounters.     After Visit Summary (AVS) reviewed and patient verbalized good understanding of medications and NPO instructions.       Nurse Plan of Action:

## 2024-10-16 NOTE — CPM/PAT H&P
[facility] Evaluation       Name: Mac Bello (Mac Bello)  /Age: 1980/43 y.o.         Date of Consult: 10/16/24     Referring Provider: Dr. Lucero    Surgery, Date, and Length: Left Shoulder Arthroscopic Superior Labrum Anterior Posterior Repair - Left , 10/21/24, 150 min    Mac Bello is a 43 year-old male who presents to the [facility] for perioperative risk assessment prior to surgery.    Patient presents with a primary diagnosis of Superior labrum anterior-to-posterior (SLAP) tear of left shoulder.     Patient  states he been having pain in the left shoulder for almost the last year. No specific injury he can remember. The pain is quite severe and keeps him up at night. Difficulty sleeping. He has been to extensive physical therapy for more than 6 weeks on the left side without significant improvements. He had an MRI ordered by another provider and is here today for surgical discussion. Pain is primarily deep and posterior in the left shoulder.     The patient's examination findings do match his MRI and that the majority of his pain is coming from labral and biceps provocative testing. He has failed more than 6 weeks of physical therapy and is dealing with the symptoms for the last year. He has also failed activity modifications and over-the-counter medications. This point I do think it is reasonable to consider surgery. This would be in the form of a superior labral repair, possible biceps tenodesis. Patient agrees with this and would like to proceed.     This note was created in part upon personal review of patient's medical records.      Patient is scheduled to have a  Left Shoulder Arthroscopic Superior Labrum Anterior Posterior Repair - Left.      Pt denies any past history of anesthetic complications such as PONV, awareness, prolonged sedation, dental damage, aspiration, cardiac arrest, difficult intubation, difficult I.V. access or unexpected hospital admissions.  NO malignant hyperthermia  and or pseudocholinesterase deficiency.  No history of blood transfusions     The patient is not a Christian and will accept blood and blood products if medically indicated.     Past Medical History:   Diagnosis Date    Hyperlipidemia     Hypothyroidism due to Hashimoto's thyroiditis     Insomnia     MURIEL (obstructive sleep apnea)     patient denies    Overweight     Peripheral neuropathy     Prediabetes     4/19/23 A1C 6.0%    Right carpal tunnel syndrome     Superior labrum anterior-to-posterior (SLAP) tear of left shoulder     Plan: Left Shoulder Arthroscopic Superior Labrum Anterior Posterior Repair 10/21/24    Vitamin D deficiency        Past Surgical History:   Procedure Laterality Date    HERNIA REPAIR      Laparoscopy Repair Of Initial Inguinal Hernia         Family History   Problem Relation Name Age of Onset    Other (cardiac disorder) Mother      Other (CVA) Mother      Hypertension Mother      Cancer Mother      Hypertension Father      Other (cardiac disorder) Other grandmother     Hypertension Other grandmother     Cancer Other grandmother     Other (cardiac disorder) Other grandfather      Social History     Socioeconomic History    Marital status: Single   Tobacco Use    Smoking status: Every Day     Types: Cigars     Passive exposure: Current   Vaping Use    Vaping status: Never Used   Substance and Sexual Activity    Alcohol use: Yes     Alcohol/week: 5.0 standard drinks of alcohol     Types: 5 Standard drinks or equivalent per week    Drug use: Yes     Types: Marijuana     Comment: daily    Sexual activity: Defer         No Known Allergies      Current Outpatient Medications:     ibuprofen 200 mg tablet, Take 1 tablet (200 mg) by mouth every 8 hours if needed for mild pain (1 - 3)., Disp: , Rfl:     levothyroxine (Synthroid, Levoxyl) 125 mcg tablet, Take 1 tablet (125 mcg) by mouth early in the morning.. Take on an empty stomach at the same time each day, either 30 to 60 minutes prior to  "breakfast, Disp: 90 tablet, Rfl: 0    lidocaine (Lidoderm) 5 % patch, Place 1 patch over 12 hours on the skin once daily. Remove & discard patch within 12 hours or as directed by MD., Disp: 30 patch, Rfl: 2    rosuvastatin (Crestor) 20 mg tablet, Take 1 tablet (20 mg) by mouth once daily., Disp: 90 tablet, Rfl: 0      PAT ROS:   Constitutional:   neg    Neuro/Psych:   neg    Eyes:    no corrective lenses  Ears:    no hearing aides  Nose:   Mouth:    Wears braces/x2 missing teeth  Throat:   Neck:   neg    Cardio:    Functional  >4 Mets. Patient denies SOB walking up 2 flights of stairs   neg     no chest pain   no palpitations   no dyspnea   no MAK  Respiratory:    Does not wear a CPAP   Endocrine:   GI:   neg     no abdominal pain   no constipation   no diarrhea   no nausea   no vomiting  :   neg    Musculoskeletal:    Left shoulder pain/limited rom    Lower back pain-arthritis in coccyx  Left leg pain   arthralgias   myalgias   decreased ROM  Hematologic:    does not bruise/bleed easily   no history of blood transfusion   no blood clots  Skin:  neg        Physical Exam  Physical exam within normal limits.   Musculoskeletal:      Comments: Left shoulder pain/limited rom           PAT AIRWAY:   Airway:     Mallampati::  II    Neck ROM::  Full   Wears braces/x2 missing teeth        Visit Vitals  BP (!) 155/94   Temp 36 °C (96.8 °F)   Resp 16   Ht 1.83 m (6' 0.05\")   Wt 90 kg (198 lb 6.6 oz)   SpO2 98%   BMI 26.87 kg/m²   Smoking Status Every Day   BSA 2.14 m²       Plan    Cardiovascular:  Patient denies any chest pain, tightness, heaviness, pressure, radiating pain, palpitations, irregular heartbeats, lightheadedness, cough, congestion, shortness of breath, MAK, PND, near syncope, weight loss or gain.    HLD:  Rosuvastatin    RCRI: 0 Risk of Mace: 3.9%    EKG in PAT on 10/16/24  Encounter Date: 10/16/24   ECG 12 Lead   Result Value    Ventricular Rate 65    Atrial Rate 65    NM Interval 198    QRS Duration 88    QT " Interval 428    QTC Calculation(Bazett) 445    P Axis 66    R Axis 66    T Axis 45    QRS Count 11    Q Onset 221    P Onset 122    P Offset 179    T Offset 435    QTC Fredericia 439    Narrative    Normal sinus rhythm  Normal ECG  No previous ECGs available  Confirmed by David Kong ([id number]) on 10/16/2024 10:09:16 AM       Pulm:  MURIEL-Patient asked to follow up with PCP for suspected MURIEL. Recommend prioritizing  nonopioid analgesic techniques (regional and local anesthesia, nonsteroidal medications, etc) before the administration of opioids and close monitoring for hypoventilation after surgery due to suspected MURIEL. If intravenous narcotics are needed beyond the immediate mauri-operative period, the patient may benefit from continuous pulse oximetry to monitor for hypoxic events till baseline Sp02 is normal on room air and  a respiratory therapy evaluation.     Does not wear CPAP    Renal/endo:  Hypothyroidism-  Levothyroxine 125 mcg  TSH (10/16/24)  72    TSH (09/04/24)  >120    T4 (10/16/24)  0.62    T4(09/04/24)  0.23    Patient had elevated TSH in September, Levothyroxine dose increased at this time.  Patient denies any thyroid symptoms. EKG normal in PAT today.  TSH trending downward with today's labs, T4 is normal. Patient has a follow up with PCP in November.    Heme:  Patient instructed to ambulate as soon as possible postoperatively to decrease thromboembolic risk.    Initiate mechanical DVT prophylaxis as soon as possible and initiate chemical prophylaxis when deemed safe from a bleeding standpoint post surgery.    Caprini=4    Risk assessment complete.  Patient is scheduled for an intermediate surgical risk procedure.      Labs/testing obtained in PAT on 10/16/24  CBC, TSH, T4  Lab Results   Component Value Date    WBC 5.9 10/16/2024    HGB 14.0 10/16/2024    HCT 42.0 10/16/2024    MCV 95 10/16/2024     10/16/2024     Lab Results   Component Value Date    TSH 72.00 (H) 10/16/2024     T4- 0.62        Preoperative medication instructions were provided and reviewed with the patient.  Any additional testing or evaluation was explained to the patient.  Nothing by mouth instructions were discussed and patient's questions were answered prior to conclusion to this encounter.  Patient verbalized understanding of preoperative instructions given in preadmission testing; discharge instructions available in EMR.    This note was dictated with speech recognition.  Minor errors may have been detected during use of speech recognition.

## 2024-10-18 ENCOUNTER — ANESTHESIA EVENT (OUTPATIENT)
Dept: OPERATING ROOM | Facility: HOSPITAL | Age: 44
End: 2024-10-18
Payer: COMMERCIAL

## 2024-10-21 ENCOUNTER — HOSPITAL ENCOUNTER (OUTPATIENT)
Facility: HOSPITAL | Age: 44
Setting detail: OUTPATIENT SURGERY
Discharge: HOME | End: 2024-10-21
Attending: STUDENT IN AN ORGANIZED HEALTH CARE EDUCATION/TRAINING PROGRAM | Admitting: STUDENT IN AN ORGANIZED HEALTH CARE EDUCATION/TRAINING PROGRAM
Payer: COMMERCIAL

## 2024-10-21 ENCOUNTER — ANESTHESIA (OUTPATIENT)
Dept: OPERATING ROOM | Facility: HOSPITAL | Age: 44
End: 2024-10-21
Payer: COMMERCIAL

## 2024-10-21 ENCOUNTER — PHARMACY VISIT (OUTPATIENT)
Dept: PHARMACY | Facility: CLINIC | Age: 44
End: 2024-10-21
Payer: COMMERCIAL

## 2024-10-21 VITALS
HEART RATE: 61 BPM | SYSTOLIC BLOOD PRESSURE: 133 MMHG | WEIGHT: 195.11 LBS | RESPIRATION RATE: 16 BRPM | BODY MASS INDEX: 26.43 KG/M2 | DIASTOLIC BLOOD PRESSURE: 77 MMHG | OXYGEN SATURATION: 99 % | TEMPERATURE: 97.2 F

## 2024-10-21 DIAGNOSIS — S43.432A SUPERIOR LABRUM ANTERIOR-TO-POSTERIOR (SLAP) TEAR OF LEFT SHOULDER: Primary | ICD-10-CM

## 2024-10-21 DIAGNOSIS — M67.922 TENDINOPATHY OF LEFT BICEPS TENDON: ICD-10-CM

## 2024-10-21 PROCEDURE — A29828 PR ARTHROSCOPY SHOULDER SURGICAL BICEPS TENODESIS: Performed by: ANESTHESIOLOGY

## 2024-10-21 PROCEDURE — 2780000003 HC OR 278 NO HCPCS: Performed by: STUDENT IN AN ORGANIZED HEALTH CARE EDUCATION/TRAINING PROGRAM

## 2024-10-21 PROCEDURE — 2500000005 HC RX 250 GENERAL PHARMACY W/O HCPCS: Performed by: ANESTHESIOLOGY

## 2024-10-21 PROCEDURE — 7100000002 HC RECOVERY ROOM TIME - EACH INCREMENTAL 1 MINUTE: Performed by: STUDENT IN AN ORGANIZED HEALTH CARE EDUCATION/TRAINING PROGRAM

## 2024-10-21 PROCEDURE — 2500000005 HC RX 250 GENERAL PHARMACY W/O HCPCS: Performed by: STUDENT IN AN ORGANIZED HEALTH CARE EDUCATION/TRAINING PROGRAM

## 2024-10-21 PROCEDURE — 64415 NJX AA&/STRD BRCH PLXS IMG: CPT | Performed by: ANESTHESIOLOGY

## 2024-10-21 PROCEDURE — 2500000004 HC RX 250 GENERAL PHARMACY W/ HCPCS (ALT 636 FOR OP/ED): Performed by: ANESTHESIOLOGY

## 2024-10-21 PROCEDURE — 29828 SHO ARTHRS SRG BICP TENODSIS: CPT | Performed by: STUDENT IN AN ORGANIZED HEALTH CARE EDUCATION/TRAINING PROGRAM

## 2024-10-21 PROCEDURE — 7100000010 HC PHASE TWO TIME - EACH INCREMENTAL 1 MINUTE: Performed by: STUDENT IN AN ORGANIZED HEALTH CARE EDUCATION/TRAINING PROGRAM

## 2024-10-21 PROCEDURE — 7100000001 HC RECOVERY ROOM TIME - INITIAL BASE CHARGE: Performed by: STUDENT IN AN ORGANIZED HEALTH CARE EDUCATION/TRAINING PROGRAM

## 2024-10-21 PROCEDURE — 29822 SHO ARTHRS SRG LMTD DBRDMT: CPT | Performed by: STUDENT IN AN ORGANIZED HEALTH CARE EDUCATION/TRAINING PROGRAM

## 2024-10-21 PROCEDURE — 3600000009 HC OR TIME - EACH INCREMENTAL 1 MINUTE - PROCEDURE LEVEL FOUR: Performed by: STUDENT IN AN ORGANIZED HEALTH CARE EDUCATION/TRAINING PROGRAM

## 2024-10-21 PROCEDURE — 2500000001 HC RX 250 WO HCPCS SELF ADMINISTERED DRUGS (ALT 637 FOR MEDICARE OP): Performed by: ANESTHESIOLOGY

## 2024-10-21 PROCEDURE — RXMED WILLOW AMBULATORY MEDICATION CHARGE

## 2024-10-21 PROCEDURE — 3700000001 HC GENERAL ANESTHESIA TIME - INITIAL BASE CHARGE: Performed by: STUDENT IN AN ORGANIZED HEALTH CARE EDUCATION/TRAINING PROGRAM

## 2024-10-21 PROCEDURE — 3600000004 HC OR TIME - INITIAL BASE CHARGE - PROCEDURE LEVEL FOUR: Performed by: STUDENT IN AN ORGANIZED HEALTH CARE EDUCATION/TRAINING PROGRAM

## 2024-10-21 PROCEDURE — 7100000009 HC PHASE TWO TIME - INITIAL BASE CHARGE: Performed by: STUDENT IN AN ORGANIZED HEALTH CARE EDUCATION/TRAINING PROGRAM

## 2024-10-21 PROCEDURE — 3700000002 HC GENERAL ANESTHESIA TIME - EACH INCREMENTAL 1 MINUTE: Performed by: STUDENT IN AN ORGANIZED HEALTH CARE EDUCATION/TRAINING PROGRAM

## 2024-10-21 PROCEDURE — 2720000007 HC OR 272 NO HCPCS: Performed by: STUDENT IN AN ORGANIZED HEALTH CARE EDUCATION/TRAINING PROGRAM

## 2024-10-21 PROCEDURE — C1889 IMPLANT/INSERT DEVICE, NOC: HCPCS | Performed by: STUDENT IN AN ORGANIZED HEALTH CARE EDUCATION/TRAINING PROGRAM

## 2024-10-21 PROCEDURE — 2500000004 HC RX 250 GENERAL PHARMACY W/ HCPCS (ALT 636 FOR OP/ED): Performed by: STUDENT IN AN ORGANIZED HEALTH CARE EDUCATION/TRAINING PROGRAM

## 2024-10-21 DEVICE — HD SCORPION LNT SYSTEM, 4.75 BC KL SWLCK
Type: IMPLANTABLE DEVICE | Site: SHOULDER | Status: FUNCTIONAL
Brand: ARTHREX®

## 2024-10-21 RX ORDER — DROPERIDOL 2.5 MG/ML
0.62 INJECTION, SOLUTION INTRAMUSCULAR; INTRAVENOUS ONCE AS NEEDED
Status: DISCONTINUED | OUTPATIENT
Start: 2024-10-21 | End: 2024-10-21 | Stop reason: HOSPADM

## 2024-10-21 RX ORDER — ESMOLOL HYDROCHLORIDE 10 MG/ML
INJECTION INTRAVENOUS AS NEEDED
Status: DISCONTINUED | OUTPATIENT
Start: 2024-10-21 | End: 2024-10-21

## 2024-10-21 RX ORDER — LIDOCAINE HYDROCHLORIDE 20 MG/ML
INJECTION, SOLUTION INFILTRATION; PERINEURAL AS NEEDED
Status: DISCONTINUED | OUTPATIENT
Start: 2024-10-21 | End: 2024-10-21

## 2024-10-21 RX ORDER — SODIUM CHLORIDE, SODIUM LACTATE, POTASSIUM CHLORIDE, CALCIUM CHLORIDE 600; 310; 30; 20 MG/100ML; MG/100ML; MG/100ML; MG/100ML
20 INJECTION, SOLUTION INTRAVENOUS CONTINUOUS
Status: CANCELLED | OUTPATIENT
Start: 2024-10-21 | End: 2024-10-22

## 2024-10-21 RX ORDER — CEFAZOLIN 1 G/1
INJECTION, POWDER, FOR SOLUTION INTRAVENOUS AS NEEDED
Status: DISCONTINUED | OUTPATIENT
Start: 2024-10-21 | End: 2024-10-21

## 2024-10-21 RX ORDER — SODIUM CHLORIDE, SODIUM LACTATE, POTASSIUM CHLORIDE, CALCIUM CHLORIDE 600; 310; 30; 20 MG/100ML; MG/100ML; MG/100ML; MG/100ML
100 INJECTION, SOLUTION INTRAVENOUS CONTINUOUS
Status: DISCONTINUED | OUTPATIENT
Start: 2024-10-21 | End: 2024-10-21 | Stop reason: HOSPADM

## 2024-10-21 RX ORDER — PROCHLORPERAZINE EDISYLATE 5 MG/ML
5 INJECTION INTRAMUSCULAR; INTRAVENOUS ONCE AS NEEDED
Status: DISCONTINUED | OUTPATIENT
Start: 2024-10-21 | End: 2024-10-21 | Stop reason: HOSPADM

## 2024-10-21 RX ORDER — ACETAMINOPHEN 500 MG
1000 TABLET ORAL EVERY 6 HOURS PRN
Qty: 30 TABLET | Refills: 2 | Status: SHIPPED | OUTPATIENT
Start: 2024-10-21 | End: 2024-11-03

## 2024-10-21 RX ORDER — CEFAZOLIN SODIUM 2 G/100ML
2 INJECTION, SOLUTION INTRAVENOUS ONCE
Status: DISCONTINUED | OUTPATIENT
Start: 2024-10-21 | End: 2024-10-21 | Stop reason: HOSPADM

## 2024-10-21 RX ORDER — DOCUSATE SODIUM 100 MG/1
100 CAPSULE, LIQUID FILLED ORAL 2 TIMES DAILY
Qty: 60 CAPSULE | Refills: 0 | Status: SHIPPED | OUTPATIENT
Start: 2024-10-21 | End: 2024-11-20

## 2024-10-21 RX ORDER — FENTANYL CITRATE 50 UG/ML
INJECTION, SOLUTION INTRAMUSCULAR; INTRAVENOUS CONTINUOUS PRN
Status: DISCONTINUED | OUTPATIENT
Start: 2024-10-21 | End: 2024-10-21

## 2024-10-21 RX ORDER — OXYCODONE HYDROCHLORIDE 5 MG/1
5 TABLET ORAL
Status: DISCONTINUED | OUTPATIENT
Start: 2024-10-21 | End: 2024-10-21 | Stop reason: HOSPADM

## 2024-10-21 RX ORDER — ROCURONIUM BROMIDE 10 MG/ML
INJECTION, SOLUTION INTRAVENOUS AS NEEDED
Status: DISCONTINUED | OUTPATIENT
Start: 2024-10-21 | End: 2024-10-21

## 2024-10-21 RX ORDER — MIDAZOLAM HYDROCHLORIDE 1 MG/ML
INJECTION INTRAMUSCULAR; INTRAVENOUS AS NEEDED
Status: DISCONTINUED | OUTPATIENT
Start: 2024-10-21 | End: 2024-10-21

## 2024-10-21 RX ORDER — ONDANSETRON HYDROCHLORIDE 2 MG/ML
4 INJECTION, SOLUTION INTRAVENOUS ONCE AS NEEDED
Status: COMPLETED | OUTPATIENT
Start: 2024-10-21 | End: 2024-10-21

## 2024-10-21 RX ORDER — ONDANSETRON HYDROCHLORIDE 2 MG/ML
INJECTION, SOLUTION INTRAVENOUS AS NEEDED
Status: DISCONTINUED | OUTPATIENT
Start: 2024-10-21 | End: 2024-10-21

## 2024-10-21 RX ORDER — METOPROLOL TARTRATE 1 MG/ML
INJECTION, SOLUTION INTRAVENOUS AS NEEDED
Status: DISCONTINUED | OUTPATIENT
Start: 2024-10-21 | End: 2024-10-21

## 2024-10-21 RX ORDER — ONDANSETRON 4 MG/1
4 TABLET, FILM COATED ORAL EVERY 8 HOURS PRN
Qty: 30 TABLET | Refills: 2 | Status: SHIPPED | OUTPATIENT
Start: 2024-10-21 | End: 2024-11-20

## 2024-10-21 RX ORDER — SODIUM CHLORIDE, SODIUM LACTATE, POTASSIUM CHLORIDE, CALCIUM CHLORIDE 600; 310; 30; 20 MG/100ML; MG/100ML; MG/100ML; MG/100ML
20 INJECTION, SOLUTION INTRAVENOUS CONTINUOUS
Status: DISCONTINUED | OUTPATIENT
Start: 2024-10-21 | End: 2024-10-21 | Stop reason: HOSPADM

## 2024-10-21 RX ORDER — HYDRALAZINE HYDROCHLORIDE 20 MG/ML
5 INJECTION INTRAMUSCULAR; INTRAVENOUS EVERY 30 MIN PRN
Status: DISCONTINUED | OUTPATIENT
Start: 2024-10-21 | End: 2024-10-21 | Stop reason: HOSPADM

## 2024-10-21 RX ORDER — ASPIRIN 81 MG/1
81 TABLET ORAL DAILY
Qty: 14 TABLET | Refills: 0 | Status: SHIPPED | OUTPATIENT
Start: 2024-10-21 | End: 2024-11-04

## 2024-10-21 RX ORDER — PROPOFOL 10 MG/ML
INJECTION, EMULSION INTRAVENOUS AS NEEDED
Status: DISCONTINUED | OUTPATIENT
Start: 2024-10-21 | End: 2024-10-21

## 2024-10-21 RX ORDER — OXYCODONE HYDROCHLORIDE 5 MG/1
5 TABLET ORAL EVERY 6 HOURS PRN
Qty: 28 TABLET | Refills: 0 | Status: SHIPPED | OUTPATIENT
Start: 2024-10-21 | End: 2024-10-28

## 2024-10-21 SDOH — HEALTH STABILITY: MENTAL HEALTH: CURRENT SMOKER: 1

## 2024-10-21 ASSESSMENT — PAIN - FUNCTIONAL ASSESSMENT
PAIN_FUNCTIONAL_ASSESSMENT: UNABLE TO SELF-REPORT
PAIN_FUNCTIONAL_ASSESSMENT: UNABLE TO SELF-REPORT
PAIN_FUNCTIONAL_ASSESSMENT: 0-10
PAIN_FUNCTIONAL_ASSESSMENT: 0-10
PAIN_FUNCTIONAL_ASSESSMENT: UNABLE TO SELF-REPORT
PAIN_FUNCTIONAL_ASSESSMENT: 0-10
PAIN_FUNCTIONAL_ASSESSMENT: 0-10

## 2024-10-21 ASSESSMENT — COLUMBIA-SUICIDE SEVERITY RATING SCALE - C-SSRS
6. HAVE YOU EVER DONE ANYTHING, STARTED TO DO ANYTHING, OR PREPARED TO DO ANYTHING TO END YOUR LIFE?: NO
2. HAVE YOU ACTUALLY HAD ANY THOUGHTS OF KILLING YOURSELF?: NO
1. IN THE PAST MONTH, HAVE YOU WISHED YOU WERE DEAD OR WISHED YOU COULD GO TO SLEEP AND NOT WAKE UP?: NO

## 2024-10-21 ASSESSMENT — PAIN SCALES - GENERAL
PAINLEVEL_OUTOF10: 2
PAINLEVEL_OUTOF10: 0 - NO PAIN
PAINLEVEL_OUTOF10: 7
PAINLEVEL_OUTOF10: 0 - NO PAIN
PAINLEVEL_OUTOF10: 0 - NO PAIN

## 2024-10-21 NOTE — ANESTHESIA PROCEDURE NOTES
Airway  Date/Time: 10/21/2024 7:42 AM  Urgency: elective    Airway not difficult    Staffing  Performed: CRNA   Authorized by: Desiree Murray MD    Performed by: MYRNA Esparza-LESLIE  Patient location during procedure: OR    Indications and Patient Condition  Indications for airway management: anesthesia  Sedation level: deep  Preoxygenated: yes  Patient position: sniffing  Mask difficulty assessment: 1 - vent by mask    Final Airway Details  Final airway type: endotracheal airway      Successful airway: ETT  Cuffed: yes   Successful intubation technique: direct laryngoscopy  Facilitating devices/methods: intubating stylet  Endotracheal tube insertion site: oral  Blade: Sukhdeep  Blade size: #4  ETT size (mm): 7.5  Cormack-Lehane Classification: grade IIa - partial view of glottis  Placement verified by: chest auscultation   Measured from: lips  ETT to lips (cm): 21  Number of attempts at approach: 1    Additional Comments  Atraumatic placement. Teeth intact

## 2024-10-21 NOTE — DISCHARGE INSTRUCTIONS
Shoulder and Elbow Service  Jamie Lucero MD    Discharge Instructions after Arthroscopic Shoulder Repair    Surgical Dressing: You have a bulky dressing over the shoulder. This needs to stay dry for 3 days. You may remove your dressing after three days and leave open to air. There will be sutures in place. Do not use any aerosol deodorants or lotions on or near surgical incision(s). You may shower 4 days after surgery. The incision(s) CANNOT get wet prior to 4 days. Simply allow the water to wash over the site and then pat dry. Do not rub the incision(s).    Activity: Remain in your sling at all times. This includes sleeping in your sling. You should come out of the sling a 4-5x times a day to work on elbow range of motion. OK to use wrist and fingers for light activities. Do not actively move your shoulder during this time. Active reaching and lifting away from the body are not permitted. You may use the operative arm for activities of daily living that do not require the operative arm to leave the side of the body. Such as: eating, drinking and bathing. Remain non-weight bearing with the operative arm until you are seen post operatively.     Pain: Pain medication has been prescribed for you. You will likely need the strong, narcotic pain medicine (usually oxycodone) for the first 72 hours. If pain is severe in the first few days, it is OK to take the oxycodone as 2 tablets every 4 hours as needed. Otherwise, take 1 tablet every 4-6 hours. You should take the extra-strength tylenol with the oxycodone, and after a few days you should be only taking the tylenol. Use cryotherapy machine or ice on the shoulder for the first 2 weeks following surgery.    Other medications: OK to resume all other home medications the day after surgery, unless you were told otherwise. Avoid taking anti-inflammatory pain medications (Advil, Motrin, Ibuprofen, Aleve, Naproxen or Naprosyn) for 2 weeks after surgery. You will also be  given anti-nausea medicine (Zofran) and anti-constipation medicine (docusate) and can use these as needed.     Blood clot prevention: Aspirin has been prescribed to prevent blood clots. Take one aspirin (81 mg) tablet once per day for 2 weeks after surgery, unless you have an aspirin sensitivity or allergy, asthma or are on blood thinners. If Coumadin, Plavix, Xarelto or other blood thinning medication is prescribed for blood clot prevention, take this medication as directed by your medical clearance physician.     Sleeping: After shoulder surgery, it is often uncomfortable to sleep on your back or side. Recommended sleeping positions are in a recliner or in bed with a ramp pillow or multiple pillows under your back.     Swelling: Swelling around the shoulder that travels to the elbow/hand/fingers is normal after shoulder surgery. Doing your elbow/wrist/finger exercises will help reduce this.     Please call the office at [phone] for any problems. Including the following:  - Excessive redness of the incision  - ANY drainage at or around incision  - Fever of more than 101.5 F    A postoperative follow up appointment will be made for you. Please call [phone] with questions. You should see Dr Lucero 10-14 days after your surgical procedure.

## 2024-10-21 NOTE — ANESTHESIA POSTPROCEDURE EVALUATION
Patient: Mac Bello    Procedure Summary       Date: 10/21/24 Room / Location: [facility] OR 18 / Virtual [facility] OR    Anesthesia Start: 0730 Anesthesia Stop: 0914    Procedure: Left Shoulder Arthroscopic Superior Labrum Anterior Posterior Repair and Bicep Tenodesis (Left: Shoulder) Diagnosis:       Superior labrum anterior-to-posterior (SLAP) tear of left shoulder      Tendinopathy of left biceps tendon      (Superior labrum anterior-to-posterior (SLAP) tear of left shoulder [S43.432A])    Surgeons: Jamie Lucero MD Responsible Provider: Desiree Murray MD    Anesthesia Type: general ASA Status: 3            Anesthesia Type: general    Vitals Value Taken Time   /77 10/21/24 1000   Temp 36.2 °C (97.2 °F) 10/21/24 1000   Pulse 63 10/21/24 1000   Resp 16 10/21/24 1000   SpO2 97 % 10/21/24 1000       Anesthesia Post Evaluation    Patient location during evaluation: PACU  Patient participation: complete - patient participated  Level of consciousness: awake  Pain management: adequate  Multimodal analgesia pain management approach  Airway patency: patent  Cardiovascular status: hemodynamically stable  Respiratory status: spontaneous ventilation  Hydration status: euvolemic  Postoperative Nausea and Vomiting: none        No notable events documented.

## 2024-10-21 NOTE — ANESTHESIA PROCEDURE NOTES
Peripheral Block    Patient location during procedure: pre-op  Start time: 10/21/2024 7:15 AM  End time: 10/21/2024 7:25 AM  Reason for block: at surgeon's request and post-op pain management  Staffing  Performed: attending   Authorized by: Desiree Murray MD    Performed by: Desiree Murray MD  Preanesthetic Checklist  Completed: patient identified, IV checked, site marked, risks and benefits discussed, surgical consent, monitors and equipment checked, pre-op evaluation and timeout performed   Timeout performed at: 10/21/2024 7:12 AM  Peripheral Block  Patient position: sitting  Prep: ChloraPrep and site prepped and draped  Patient monitoring: heart rate, cardiac monitor and continuous pulse ox  Block type: interscalene and brachial plexus  Laterality: left  Injection technique: single-shot  Guidance: nerve stimulator, ultrasound guided and ultrasound image saved to chart.  Local infiltration: lidocaine  Infiltration strength: 1 %  Dose: 2 mL  Needle  Needle type: short-bevel   Needle gauge: 22 G  Needle length: 5 cm  Needle localization: anatomical landmarks, nerve stimulator and ultrasound guidance  Test dose: negative  Assessment  Injection assessment: negative aspiration for heme, no paresthesia on injection, incremental injection and local visualized surrounding nerve on ultrasound  Paresthesia pain: none  Heart rate change: no  Slow fractionated injection: no  Additional Notes  Pre-medication with Versed 5 mg  intravenously.  Following a focussed neurological history, procedure-related and patient-specific complications were discussed. Verbal consent was provided by the patient and/or surrogate decision maker for Interscalene brachial plexus block. Anticoagulation (if any) was held per RAMY guidelines.  No Evoked Motor Response was visible at < 0.3 mA. Using in-plane needle visualization, 30 ml of 0.5% Bupivacaine with epi 5 mcg/ml, precedex 20 mcg and decadron 4 mg was injected extraneurally in 5 ml  increments. There was no resistance to injection and appropriate injection pressures were maintained based on tactile feedback. Throughout the procedure, there was consistent and meaningful verbal communication with the patient. Post procedure vital signs were stable and no immediate complications were noted. PACU will provide written instructions that outline the specific precautions to be taken when caring for an akinetic, insensate extremity.

## 2024-10-21 NOTE — PERIOPERATIVE NURSING NOTE
0910 Pt to [facility] on sfm. Sfm removed and pt placed on room air. Bedside report given to this rn by or rn and aa.     0919 Pt family updated via text message.    0945 Pt sipped ice water. Pt denies discomfort at this time.     1012 Pt family brought back to bedside.     1024 Pt eating pudding at this time    1025 Discharge instructions provided to pt and family. Educated on medications, effects of anesthesia, and homecoming care. Pt and family verbalizing understanding of all instructions provided at this time. All questions and concerns answered. Pt given contact information for provider.     1030 Meds to beds pharmacist at bedside.     1040 Pt assisted with dressing with help of family. IV removed dressing applied.

## 2024-10-21 NOTE — ANESTHESIA PREPROCEDURE EVALUATION
Patient: Mac Bello    Procedure Information       Date/Time: 10/21/24 0730    Procedure: Left Shoulder Arthroscopic Superior Labrum Anterior Posterior Repair (Left: Shoulder) - Pre-op block    Location: [facility] A OR 18 / Virtual [facility] A OR    Surgeons: Jamie Lucero MD                                                           Pre-Anesthesia Evaluation      Mac Bello is a 43 y.o. male who presents for the above mentioned procedure due to Superior labrum anterior-to-posterior (SLAP) tear of left shoulder [S43.432A]       Past Medical History:   Diagnosis Date    Hyperlipidemia     Hypothyroidism due to Hashimoto's thyroiditis     Insomnia     MURIEL (obstructive sleep apnea)     patient denies    Overweight     Peripheral neuropathy     Prediabetes     4/19/23 A1C 6.0%    Right carpal tunnel syndrome     Superior labrum anterior-to-posterior (SLAP) tear of left shoulder     Plan: Left Shoulder Arthroscopic Superior Labrum Anterior Posterior Repair 10/21/24    Vitamin D deficiency      Past Surgical History:   Procedure Laterality Date    HERNIA REPAIR      Laparoscopy Repair Of Initial Inguinal Hernia     Social History   He reports that he has been smoking cigars. He has been exposed to tobacco smoke. He does not have any smokeless tobacco history on file. He reports current alcohol use of about 5.0 standard drinks of alcohol per week. He reports current drug use. Drug: Marijuana.    No Known Allergies  Current Outpatient Medications   Medication Instructions    acetaminophen (TYLENOL EXTRA STRENGTH) 1,000 mg, oral, Every 6 hours PRN    aspirin 81 mg, oral, Daily    docusate sodium (COLACE) 100 mg, oral, 2 times daily, As needed for constipation    ibuprofen 200 mg, Every 8 hours PRN    levothyroxine (SYNTHROID, LEVOXYL) 125 mcg, oral, Daily, Take on an empty stomach at the same time each day, either 30 to 60 minutes prior to breakfast    lidocaine (Lidoderm) 5 % patch 1 patch, transdermal, Daily, Remove & discard  patch within 12 hours or as directed by MD.    ondansetron (ZOFRAN) 4 mg, oral, Every 8 hours PRN, As needed for nausea    oxyCODONE (ROXICODONE) 5 mg, oral, Every 6 hours PRN    rosuvastatin (CRESTOR) 20 mg, oral, Daily       Lab Results   Component Value Date/Time    WBC 5.9 10/16/2024 0936    HGB 14.0 10/16/2024 0936    HCT 42.0 10/16/2024 0936     10/16/2024 0936     Lab Results   Component Value Date    GLUCOSE 83 09/04/2024     09/04/2024    K 3.8 09/04/2024     09/04/2024    CO2 29 09/04/2024    ANIONGAP 14 09/04/2024    BUN 21 09/04/2024    CREATININE 1.07 09/04/2024    EGFR 88 09/04/2024      Lab Results   Component Value Date/Time    BILITOT 0.4 09/04/2024 1038    PROT 7.3 09/04/2024 1038    ALBUMIN 4.8 09/04/2024 1038    AST 33 09/04/2024 1038    ALT 22 09/04/2024 1038        Recent Labs     10/16/24  0936 09/04/24  1038 12/27/23  1321 08/09/23  1350 04/19/23  0941 10/27/22  1119   HGBA1C  --   --   --   --  6.0* 6.0*   TSH 72.00* >120.00*   < > 1.51 0.26* 22.00*   FREET4 0.62 0.23*   < >  --  1.99* 1.34   CALCIUM  --  9.6  --  9.6 10.0 9.9   PTH  --   --   --   --  41.5  --     < > = values in this interval not displayed.         Encounter Date: 10/16/24   ECG 12 Lead   Result Value    Ventricular Rate 65    Atrial Rate 65    OR Interval 198    QRS Duration 88    QT Interval 428    QTC Calculation(Bazett) 445    P Axis 66    R Axis 66    T Axis 45    QRS Count 11    Q Onset 221    P Onset 122    P Offset 179    T Offset 435    QTC Fredericia 439    Narrative    Normal sinus rhythm  Normal ECG  No previous ECGs available  Confirmed by David Kong ([id number]) on 10/16/2024 10:09:16 AM       Visit Vitals  BP (!) 162/100   Pulse 76   Temp 36.5 °C (97.7 °F) (Temporal)   Resp 18   Wt 88.5 kg (195 lb 1.7 oz)   SpO2 100%   BMI 26.43 kg/m²   Smoking Status Every Day   BSA 2.12 m²               Relevant Problems   Cardiac   (+) Hyperlipidemia      Pulmonary   (+) MURIEL (obstructive sleep apnea)       Neuro   (+) Lumbar radiculopathy   (+) Peripheral neuropathy      Endocrine   (+) Hypothyroidism   (+) Hypothyroidism due to Hashimoto's thyroiditis      Musculoskeletal   (+) Superior labrum anterior-to-posterior (SLAP) tear of left shoulder       Clinical information reviewed:   Tobacco  Allergies  Meds   Med Hx  Surg Hx   Fam Hx  Soc Hx        NPO Detail:  NPO/Void Status  Date of Last Liquid: 10/21/24  Time of Last Liquid: 0500  Date of Last Solid: 10/20/24  Time of Last Solid: 2330         Physical Exam    Airway  Mallampati: II  TM distance: >3 FB  Neck ROM: full     Cardiovascular   Rhythm: regular  Rate: normal     Dental - normal exam     Pulmonary   Comments: Normal RR  Non-labored respiration    Abdominal            Anesthesia Plan    History of general anesthesia?: yes  History of complications of general anesthesia?: no    ASA 3     general   (General with ETT. Standard ASA monitoring.  Nerve block requested by surgeon for post -op analgesia. )  The patient is a current smoker.  Patient was previously instructed to abstain from smoking on day of procedure.  Patient smoked on day of procedure.  Education provided regarding risk of obstructive sleep apnea.  intravenous induction   Anesthetic plan and risks discussed with patient.    Plan discussed with CAA and CRNA.

## 2024-10-26 ENCOUNTER — PHARMACY VISIT (OUTPATIENT)
Dept: PHARMACY | Facility: CLINIC | Age: 44
End: 2024-10-26
Payer: COMMERCIAL

## 2024-10-26 PROCEDURE — RXMED WILLOW AMBULATORY MEDICATION CHARGE

## 2024-11-05 ENCOUNTER — OFFICE VISIT (OUTPATIENT)
Dept: ORTHOPEDIC SURGERY | Facility: CLINIC | Age: 44
End: 2024-11-05
Payer: COMMERCIAL

## 2024-11-05 VITALS — WEIGHT: 195 LBS | HEIGHT: 72 IN | BODY MASS INDEX: 26.41 KG/M2

## 2024-11-05 DIAGNOSIS — S43.432A SUPERIOR LABRUM ANTERIOR-TO-POSTERIOR (SLAP) TEAR OF LEFT SHOULDER: Primary | ICD-10-CM

## 2024-11-05 PROCEDURE — 99211 OFF/OP EST MAY X REQ PHY/QHP: CPT | Performed by: STUDENT IN AN ORGANIZED HEALTH CARE EDUCATION/TRAINING PROGRAM

## 2024-11-05 PROCEDURE — 3008F BODY MASS INDEX DOCD: CPT | Performed by: STUDENT IN AN ORGANIZED HEALTH CARE EDUCATION/TRAINING PROGRAM

## 2024-11-05 PROCEDURE — 4004F PT TOBACCO SCREEN RCVD TLK: CPT | Performed by: STUDENT IN AN ORGANIZED HEALTH CARE EDUCATION/TRAINING PROGRAM

## 2024-11-05 ASSESSMENT — PAIN DESCRIPTION - DESCRIPTORS: DESCRIPTORS: ACHING;SORE

## 2024-11-05 ASSESSMENT — PAIN SCALES - GENERAL: PAINLEVEL_OUTOF10: 5 - MODERATE PAIN

## 2024-11-05 ASSESSMENT — PAIN - FUNCTIONAL ASSESSMENT: PAIN_FUNCTIONAL_ASSESSMENT: 0-10

## 2024-11-05 NOTE — LETTER
November 5, 2024     Patient: Mac Bello   YOB: 1980   Date of Visit: 11/5/2024       To Whom It May Concern:    It is my medical opinion that Mac Bello may return to work on 11/7/24 with No Lifting on the Left arm .    If you have any questions or concerns, please don't hesitate to call.         Sincerely,        Jamie Lucero MD    CC: No Recipients

## 2024-11-05 NOTE — PROGRESS NOTES
History: Patient returns to the office status post L BT for SLAP on 2 weeks ago. Patient has been immobilized in a sling and pain is well controlled. Denies numbness/tingling.     Past medical history, past surgical history, medications, allergies, family history, social history, and review of systems were reviewed today and have been documented separately in this encounter.   A 12 point review of systems was negative other than as stated in the HPI.    Physical Examination:    On exam of the left upper extremity, incisions are well healed, without significant erythema or drainage, sutures were removed today. Demonstrates full ROM of the elbow/wrist/hand. Neurovascularly intact throughout.    Assessment: 2 weeks s/p L BT     Plan: At this point we will start PT. OK to return to work with no lifting of left arm. Follow up in 1 month. All questions answered.     Dragon software was used to dictate this note, please be aware that minor errors in transcription may be present.    Jamie Lucero MD    Shoulder/Elbow Surgery  [facility]

## 2025-01-10 NOTE — PROGRESS NOTES
FUV for hypothyroidism. LV with Dr. Mckeon 06/2023.    Subjective   Mac Bello is a 44 y.o. male with a hx of hypothyroidism and HLD who presents for management of hypothyroidism.    Current LT4 regimen: levothyroxine    Hx of TSH>120 multiple times in the past.  Admits to having difficulties remembering to take the levothyroxine in the past.  Now has an alarm to remind him.    ROS  General: no fever or chills  CV: no chest pain   Respiratory: no shortness of breath  MSK: no lower extremity edema  Neuro: no headache or dizziness  See HPI for Endocrine ROS    Past Medical History:   Diagnosis Date    Hyperlipidemia     Hypothyroidism due to Hashimoto's thyroiditis     Insomnia     MURIEL (obstructive sleep apnea)     patient denies    Overweight     Peripheral neuropathy     Prediabetes     4/19/23 A1C 6.0%    Right carpal tunnel syndrome     Superior labrum anterior-to-posterior (SLAP) tear of left shoulder     Plan: Left Shoulder Arthroscopic Superior Labrum Anterior Posterior Repair 10/21/24    Vitamin D deficiency        Past Surgical History:   Procedure Laterality Date    HERNIA REPAIR      Laparoscopy Repair Of Initial Inguinal Hernia       Social History     Socioeconomic History    Marital status: Single     Spouse name: Not on file    Number of children: Not on file    Years of education: Not on file    Highest education level: Not on file   Occupational History    Not on file   Tobacco Use    Smoking status: Every Day     Types: Cigars     Passive exposure: Current    Smokeless tobacco: Not on file   Vaping Use    Vaping status: Never Used   Substance and Sexual Activity    Alcohol use: Yes     Alcohol/week: 5.0 standard drinks of alcohol     Types: 5 Standard drinks or equivalent per week    Drug use: Yes     Types: Marijuana     Comment: daily    Sexual activity: Defer   Other Topics Concern    Not on file   Social History Narrative    Not on file     Social Drivers of Health     Financial Resource  Strain: Not on file   Food Insecurity: Not on file   Transportation Needs: Not on file   Physical Activity: Not on file   Stress: Not on file   Social Connections: Not on file   Intimate Partner Violence: Not on file   Housing Stability: Not on file       Objective    Physical Exam  Blood pressure 128/82, pulse 62, height 1.829 m (6'), weight 93.9 kg (207 lb).  General: not in acute distress  HEENT: EMMANUEL, EOMI  Thyroid: no goiter  Neuro: alert and oriented x 3      Current Outpatient Medications:     rosuvastatin (Crestor) 20 mg tablet, Take 1 tablet (20 mg) by mouth once daily., Disp: 90 tablet, Rfl: 0    levothyroxine (Synthroid, Levoxyl) 125 mcg tablet, Take 1 tablet (125 mcg) by mouth early in the morning.. Take on an empty stomach at the same time each day, either 30 to 60 minutes prior to breakfast, Disp: 30 tablet, Rfl: 0   Latest Reference Range & Units 03/25/20 12:16 01/04/21 15:53 02/10/22 08:19 04/28/22 11:31 09/21/22 10:00 10/27/22 11:19   Thyroxine, Free 0.78 - 1.48 ng/dL 0.41 (L) 0.42 (L) 0.63 1.26 1.10 1.34   Thyroid Stimulating Hormone 0.44 - 3.98 mIU/L >120.00 (H) >120.00 (H) 66.00 (H) 16.77 (H) 20.60 (H) 22.00 (H)      Latest Reference Range & Units 04/19/23 09:41 08/09/23 13:50 12/27/23 13:21 05/08/24 09:54 09/04/24 10:38 10/16/24 09:36   Thyroxine, Free 0.61 - 1.12 ng/dL 1.99 (H)  <0.20 (L)  0.23 (L) 0.62   Thyroid Stimulating Hormone 0.44 - 3.98 mIU/L 0.26 (L) 1.51 >120.00 (H) >120.00 (H) >120.00 (H) 72.00 (H)   *04/2022: on levothyroxine 150 mcg/day  *10/2022: on levothyroxine 200 mcg/day  *03/2023: on levothyroxine 250 mcg/day  *06/2023: on levothyroxine 250 mcg, 5 days per week, 200 mcg, 2 days per week  *05/2024: on levothyroxine 150 mcg/day  *09/2024: on levothyroxine 125 mcg/day    Assessment/Plan   Mac Bello is a 44 y.o. male with a hx of hypothyroidism due to Hashimoto's thyroiditis and HDL who presents for management of hypothyroidism.    Hypothyroidism  Current regimen:  levothyroxine 125 mcg/day  Weight-based dose: 150 mcg/day  Taking LT4 appropriately?: takes at 8 am with rosuvastatin, waits at least an hour before eating/drinking    Hx of TSH>120 several times over the past few years.  Poor adherence in the past, but he is likely under-dosed right now.  Unclear why LT4 dose was reduced significantly despite severe hypothyroidism in May.    Has been out of levothyroxine for the past 5 days.  Forgets to take it when he is off of his usual routine, but usually has an alarm to remind him to take the levothyroxine.  Does take rosuvastatin with the levothyroxine because otherwise he will forget to take the rosuvastatin. Given his issues with adherence in the past, I advised him that he can continue to take both together. Advised that if he takes a multivitamin, he should take this at night (or at least 4 hours away from levothyroxine).    Will check TSH and adjust levothyroxine accordingly, but he will likely need a dose between 150 and 235 mcg/day.    Helped him set up [employer] again.  He is able to log in now on his phone camilo. Communication preferences set as well.    PLAN:  -check TSH  -continue levothyroxine 125 mcg/day for now    Follow up in 6 months  Uses [employer].

## 2025-01-16 ENCOUNTER — APPOINTMENT (OUTPATIENT)
Dept: ENDOCRINOLOGY | Facility: CLINIC | Age: 45
End: 2025-01-16
Payer: COMMERCIAL

## 2025-01-16 VITALS
HEIGHT: 72 IN | SYSTOLIC BLOOD PRESSURE: 128 MMHG | HEART RATE: 62 BPM | DIASTOLIC BLOOD PRESSURE: 82 MMHG | BODY MASS INDEX: 28.04 KG/M2 | WEIGHT: 207 LBS

## 2025-01-16 DIAGNOSIS — E03.9 HYPOTHYROIDISM, UNSPECIFIED TYPE: ICD-10-CM

## 2025-01-16 PROCEDURE — 3008F BODY MASS INDEX DOCD: CPT | Performed by: STUDENT IN AN ORGANIZED HEALTH CARE EDUCATION/TRAINING PROGRAM

## 2025-01-16 PROCEDURE — 99214 OFFICE O/P EST MOD 30 MIN: CPT | Performed by: STUDENT IN AN ORGANIZED HEALTH CARE EDUCATION/TRAINING PROGRAM

## 2025-01-16 RX ORDER — LEVOTHYROXINE SODIUM 125 UG/1
125 TABLET ORAL DAILY
Qty: 30 TABLET | Refills: 0 | Status: SHIPPED | OUTPATIENT
Start: 2025-01-16

## 2025-02-20 DIAGNOSIS — E03.9 HYPOTHYROIDISM, UNSPECIFIED TYPE: ICD-10-CM

## 2025-02-21 DIAGNOSIS — E03.9 HYPOTHYROIDISM, UNSPECIFIED TYPE: Primary | ICD-10-CM

## 2025-02-21 RX ORDER — LEVOTHYROXINE SODIUM 125 UG/1
TABLET ORAL
Qty: 30 TABLET | Refills: 5 | Status: SHIPPED | OUTPATIENT
Start: 2025-02-21

## 2025-07-07 NOTE — PROGRESS NOTES
FUV for hypothyroidism. LV with me 01/2025.    An interactive audio and video telecommunication system which permits real time communications between the patient (at the originating site) and provider (at the distant site) was utilized to provide this telehealth service.    Verbal consent was requested and obtained from Mac Bello on 07/16/25 10:12 AM for a telehealth visit.       Subjective   Mac Bello is a 44 y.o. male with a hx of hypothyroidism and HLD who presents for management of hypothyroidism.    Current LT4 regimen: levothyroxine    Hx of TSH>120 multiple times in the past.  Admits to having difficulties remembering to take the levothyroxine in the past.    Today:  -really tired and sluggish, feeling depressed    ROS  General: no fever or chills  CV: no chest pain   Respiratory: no shortness of breath  MSK: no lower extremity edema  Neuro: no headache or dizziness  See HPI for Endocrine ROS    Past Medical History:   Diagnosis Date    Hyperlipidemia     Hypothyroidism due to Hashimoto's thyroiditis     Insomnia     MURIEL (obstructive sleep apnea)     patient denies    Overweight     Peripheral neuropathy     Prediabetes     4/19/23 A1C 6.0%    Right carpal tunnel syndrome     Superior labrum anterior-to-posterior (SLAP) tear of left shoulder     Plan: Left Shoulder Arthroscopic Superior Labrum Anterior Posterior Repair 10/21/24    Vitamin D deficiency        Past Surgical History:   Procedure Laterality Date    HERNIA REPAIR      Laparoscopy Repair Of Initial Inguinal Hernia       Social History     Socioeconomic History    Marital status: Single     Spouse name: Not on file    Number of children: Not on file    Years of education: Not on file    Highest education level: Not on file   Occupational History    Not on file   Tobacco Use    Smoking status: Every Day     Types: Cigars     Passive exposure: Current    Smokeless tobacco: Not on file   Vaping Use    Vaping status: Never Used   Substance and  Sexual Activity    Alcohol use: Yes     Alcohol/week: 5.0 standard drinks of alcohol     Types: 5 Standard drinks or equivalent per week    Drug use: Yes     Types: Marijuana     Comment: daily    Sexual activity: Defer   Other Topics Concern    Not on file   Social History Narrative    Not on file     Social Drivers of Health     Financial Resource Strain: Not on file   Food Insecurity: Not on file   Transportation Needs: Not on file   Physical Activity: Not on file   Stress: Not on file   Social Connections: Not on file   Intimate Partner Violence: Not on file   Housing Stability: Not on file       Objective    Physical Exam  There were no vitals taken for this visit.  General: not in acute distress  HEENT: EMMANUEL, EOMI  Thyroid: no goiter  Neuro: alert and oriented x 3      Current Outpatient Medications:     levothyroxine (Synthroid, Levoxyl) 125 mcg tablet, TAKE 1 TABLET BY MOUTH EARLY EVERY MORNING ON AN EMPTY STOMACH AT THE SAME TIME EACH DAY WITHER 30 TO 60 MINUTES BEFORE BREAKFAST, Disp: 30 tablet, Rfl: 5    rosuvastatin (Crestor) 20 mg tablet, Take 1 tablet (20 mg) by mouth once daily., Disp: 90 tablet, Rfl: 0   Latest Reference Range & Units 03/25/20 12:16 01/04/21 15:53 02/10/22 08:19 04/28/22 11:31 09/21/22 10:00 10/27/22 11:19   Thyroxine, Free 0.78 - 1.48 ng/dL 0.41 (L) 0.42 (L) 0.63 1.26 1.10 1.34   Thyroid Stimulating Hormone 0.44 - 3.98 mIU/L >120.00 (H) >120.00 (H) 66.00 (H) 16.77 (H) 20.60 (H) 22.00 (H)      Latest Reference Range & Units 04/19/23 09:41 08/09/23 13:50 12/27/23 13:21 05/08/24 09:54 09/04/24 10:38 10/16/24 09:36   Thyroxine, Free 0.61 - 1.12 ng/dL 1.99 (H)  <0.20 (L)  0.23 (L) 0.62   Thyroid Stimulating Hormone 0.44 - 3.98 mIU/L 0.26 (L) 1.51 >120.00 (H) >120.00 (H) >120.00 (H) 72.00 (H)   *04/2022: on levothyroxine 150 mcg/day  *10/2022: on levothyroxine 200 mcg/day  *03/2023: on levothyroxine 250 mcg/day  *06/2023: on levothyroxine 250 mcg, 5 days per week, 200 mcg, 2 days per  week  *05/2024: on levothyroxine 150 mcg/day  *09/2024: on levothyroxine 125 mcg/day    Assessment/Plan   Mac Bello is a 44 y.o. male with a hx of hypothyroidism due to Hashimoto's thyroiditis and HDL who presents for management of hypothyroidism.    Hypothyroidism  Current regimen: levothyroxine 125 mcg/day  Weight-based dose: 150 mcg/day  Taking LT4 appropriately?: takes at 8 am with rosuvastatin, waits at least an hour before eating/drinking    Hx of TSH>120 several times over the past few years.  Poor adherence in the past, but he is likely under-dosed right now.  Unclear why LT4 dose was reduced significantly despite severe hypothyroidism in May.    Labs from 10/2024  TSH 72    Labs from 07/2025  TSH >150    He did not have labs done after last visit in January, so I was not able to adjust his dose.  No dispense history for levothyroxine for May and June.  He reports he that he has not taken levothyroxine for the past 3 weeks because he was in [facility] and did not have the medication with him.   He was taking levothyroxine relatively consistently (missing 1 day per week) prior to cessation.  TSH remains very elevated.  Although non-adherence remains the main issue, given that he is currently under-dosed based on weight, will increase his dose.  Because he has overt symptoms of hypothyroidism (fatigue, depressive feeling, sluggishness), will have him take a loading dose of double dose for 5 days when he starts his new dose (no hx of cardiovascular disease).    Does take rosuvastatin with the levothyroxine because otherwise he will forget to take the rosuvastatin. Given his issues with adherence in the past, I advised him that he can continue to take both together. Advised that if he takes a multivitamin, he should take this at night (or at least 4 hours away from levothyroxine).    PHQ-9 was 10 today.  Feeling depressed recently. No SI/HI.  Discussed that the severe hypothyroidism is likely  contributing.  Does not have PCP right now. He would like to see a mental health provider.  Will refer but will also provide resources outside of  for sooner appointments.  I will send these resources to him via [employer].    PLAN:  -increase levothyroxine to 175 mcg/day (double dose for 5 days)  -check TSH before next visit  -refer to psychiatry    Follow up in 3 months with Dr. Molina ([place])-labs prior  Uses [facility].

## 2025-07-14 ENCOUNTER — TELEPHONE (OUTPATIENT)
Dept: ENDOCRINOLOGY | Facility: CLINIC | Age: 45
End: 2025-07-14
Payer: COMMERCIAL

## 2025-07-15 ENCOUNTER — TELEPHONE (OUTPATIENT)
Dept: ENDOCRINOLOGY | Facility: CLINIC | Age: 45
End: 2025-07-15
Payer: COMMERCIAL

## 2025-07-15 NOTE — TELEPHONE ENCOUNTER
spoke with patient appt/ lab reminder/ states he had labs drawn today/ would like to change appt to televisit. message sent to Dr. Zimmer/ awaiting response.

## 2025-07-16 ENCOUNTER — APPOINTMENT (OUTPATIENT)
Dept: ENDOCRINOLOGY | Facility: CLINIC | Age: 45
End: 2025-07-16
Payer: COMMERCIAL

## 2025-07-16 DIAGNOSIS — F32.89 OTHER DEPRESSION: ICD-10-CM

## 2025-07-16 DIAGNOSIS — E78.2 MIXED HYPERLIPIDEMIA: ICD-10-CM

## 2025-07-16 DIAGNOSIS — E03.9 HYPOTHYROIDISM, UNSPECIFIED TYPE: Primary | ICD-10-CM

## 2025-07-16 LAB
T4 FREE SERPL-MCNC: 0.2 NG/DL (ref 0.8–1.8)
TSH SERPL-ACNC: >150 MIU/L (ref 0.4–4.5)

## 2025-07-16 PROCEDURE — 99215 OFFICE O/P EST HI 40 MIN: CPT | Performed by: STUDENT IN AN ORGANIZED HEALTH CARE EDUCATION/TRAINING PROGRAM

## 2025-07-16 RX ORDER — ROSUVASTATIN CALCIUM 20 MG/1
20 TABLET, COATED ORAL DAILY
Qty: 90 TABLET | Refills: 0 | Status: SHIPPED | OUTPATIENT
Start: 2025-07-16

## 2025-07-16 RX ORDER — LEVOTHYROXINE SODIUM 175 UG/1
TABLET ORAL
Qty: 95 TABLET | Refills: 3 | Status: SHIPPED | OUTPATIENT
Start: 2025-07-16

## 2025-07-16 ASSESSMENT — PATIENT HEALTH QUESTIONNAIRE - PHQ9
4. FEELING TIRED OR HAVING LITTLE ENERGY: NEARLY EVERY DAY
1. LITTLE INTEREST OR PLEASURE IN DOING THINGS: SEVERAL DAYS
6. FEELING BAD ABOUT YOURSELF - OR THAT YOU ARE A FAILURE OR HAVE LET YOURSELF OR YOUR FAMILY DOWN: NOT AT ALL
SUM OF ALL RESPONSES TO PHQ9 QUESTIONS 1 AND 2: 2
SUM OF ALL RESPONSES TO PHQ QUESTIONS 1-9: 10
3. TROUBLE FALLING OR STAYING ASLEEP OR SLEEPING TOO MUCH: MORE THAN HALF THE DAYS
2. FEELING DOWN, DEPRESSED OR HOPELESS: SEVERAL DAYS
5. POOR APPETITE OR OVEREATING: NEARLY EVERY DAY
8. MOVING OR SPEAKING SO SLOWLY THAT OTHER PEOPLE COULD HAVE NOTICED. OR THE OPPOSITE, BEING SO FIGETY OR RESTLESS THAT YOU HAVE BEEN MOVING AROUND A LOT MORE THAN USUAL: NOT AT ALL
7. TROUBLE CONCENTRATING ON THINGS, SUCH AS READING THE NEWSPAPER OR WATCHING TELEVISION: NOT AT ALL
9. THOUGHTS THAT YOU WOULD BE BETTER OFF DEAD, OR OF HURTING YOURSELF: NOT AT ALL

## 2025-07-16 ASSESSMENT — PAIN SCALES - GENERAL: PAINLEVEL_OUTOF10: 4

## 2025-07-16 ASSESSMENT — ENCOUNTER SYMPTOMS
OCCASIONAL FEELINGS OF UNSTEADINESS: 0
DEPRESSION: 1
LOSS OF SENSATION IN FEET: 0

## 2025-10-10 ENCOUNTER — APPOINTMENT (OUTPATIENT)
Dept: ENDOCRINOLOGY | Facility: CLINIC | Age: 45
End: 2025-10-10
Payer: COMMERCIAL

## (undated) DEVICE — MASK, FACE, TENET, FOAM POSITIONING, DISPOSABLE

## (undated) DEVICE — DRAPE, SHEET, THREE QUARTER, FAN FOLD, 57 X 77 IN

## (undated) DEVICE — GLOVE, SURGICAL, PROTEXIS PI MICRO, 7.0, PF, LF

## (undated) DEVICE — TUBING, PUMP REDEUCE 8FT STERILE

## (undated) DEVICE — DRAPE, SHEET, 17 X 23 IN

## (undated) DEVICE — DRESSING, GAUZE, PETROLATUM, PATCH, XEROFORM, 1 X 8 IN, STERILE

## (undated) DEVICE — PROBE, APOLLO RF, 90 DEG, EXTRA LARTGE

## (undated) DEVICE — SLING, ARM, LARGE

## (undated) DEVICE — TUBING, SUCTION, NON-CONDUCTIVE, W/CONNECT,.25 IN X 12 FT, STERILE, LF

## (undated) DEVICE — DRAPE, INSTRUMENT, W/POUCH, STERI DRAPE, 7 X 11 IN, DISPOSABLE, STERILE

## (undated) DEVICE — PAD, GROUNDING, ELECTROSURGICAL, W/9 FT CABLE, POLYHESIVE II, ADULT, LF

## (undated) DEVICE — CANNULA, TRIPLE-DAM TWIST-IN, 7MM X 7CM, VALVED

## (undated) DEVICE — GLOVE, SURGICAL, PROTEXIS PI BLUE W/NEUTHERA, 7.5, PF, LF

## (undated) DEVICE — MAT, FLOOR, SURGISAFE, FLUID CONTROL, 46X40

## (undated) DEVICE — KIT, BEACH CHAIR TRIMANO

## (undated) DEVICE — TUBING, PATIENT 8FT STERILE

## (undated) DEVICE — ARTHROWAND, AMBIENT SUPER TURBOVAC 90

## (undated) DEVICE — SUTURE, ETHILON, 3-0, 30 IN, FS-1, BLACK